# Patient Record
Sex: FEMALE | Race: WHITE | NOT HISPANIC OR LATINO | URBAN - METROPOLITAN AREA
[De-identification: names, ages, dates, MRNs, and addresses within clinical notes are randomized per-mention and may not be internally consistent; named-entity substitution may affect disease eponyms.]

---

## 2018-06-15 ENCOUNTER — TELEPHONE (OUTPATIENT)
Dept: NEUROLOGY | Facility: CLINIC | Age: 53
End: 2018-06-15

## 2018-06-15 NOTE — TELEPHONE ENCOUNTER
----- Message from Ivette Albright sent at 6/14/2018  3:05 PM CDT -----  Contact: Meghana (Daughter) [phone]  Needs Advice    Reason for call:    Meghana is requesting to speak to someone regarding the patient's condition. The patient is paying out of pocket and would like to be 100% sure she scheduling with the correct department for the issues she's having.    Communication Preference: PHONE

## 2022-03-30 ENCOUNTER — ANESTHESIA (OUTPATIENT)
Dept: SURGERY | Facility: HOSPITAL | Age: 57
End: 2022-03-30
Payer: MEDICAID

## 2022-03-30 ENCOUNTER — HOSPITAL ENCOUNTER (INPATIENT)
Facility: HOSPITAL | Age: 57
LOS: 5 days | Discharge: HOME | End: 2022-04-04
Attending: EMERGENCY MEDICINE | Admitting: SURGERY
Payer: MEDICAID

## 2022-03-30 ENCOUNTER — ANESTHESIA EVENT (OUTPATIENT)
Dept: SURGERY | Facility: HOSPITAL | Age: 57
End: 2022-03-30
Payer: MEDICAID

## 2022-03-30 DIAGNOSIS — K66.8 PNEUMOPERITONEUM: ICD-10-CM

## 2022-03-30 DIAGNOSIS — K27.5 PERFORATED ULCER: ICD-10-CM

## 2022-03-30 DIAGNOSIS — Z91.89 AT RISK FOR LONG QT SYNDROME: ICD-10-CM

## 2022-03-30 DIAGNOSIS — K28.3 ACUTE MARGINAL ULCER: Primary | ICD-10-CM

## 2022-03-30 LAB
CTP QC/QA: YES
POCT GLUCOSE: 123 MG/DL (ref 70–110)
SARS-COV-2 RDRP RESP QL NAA+PROBE: NEGATIVE

## 2022-03-30 PROCEDURE — 99285 EMERGENCY DEPT VISIT HI MDM: CPT | Mod: CS,,, | Performed by: EMERGENCY MEDICINE

## 2022-03-30 PROCEDURE — 27201423 OPTIME MED/SURG SUP & DEVICES STERILE SUPPLY: Performed by: SURGERY

## 2022-03-30 PROCEDURE — 63600175 PHARM REV CODE 636 W HCPCS: Performed by: NURSE ANESTHETIST, CERTIFIED REGISTERED

## 2022-03-30 PROCEDURE — D9220A PRA ANESTHESIA: Mod: CRNA,,, | Performed by: NURSE ANESTHETIST, CERTIFIED REGISTERED

## 2022-03-30 PROCEDURE — 25000003 PHARM REV CODE 250: Performed by: NURSE ANESTHETIST, CERTIFIED REGISTERED

## 2022-03-30 PROCEDURE — 71000033 HC RECOVERY, INTIAL HOUR: Performed by: SURGERY

## 2022-03-30 PROCEDURE — 43659 UNLISTED LAPS PX STOMACH: CPT | Mod: ,,, | Performed by: SURGERY

## 2022-03-30 PROCEDURE — 71000016 HC POSTOP RECOV ADDL HR: Performed by: SURGERY

## 2022-03-30 PROCEDURE — D9220A PRA ANESTHESIA: Mod: ANES,,, | Performed by: ANESTHESIOLOGY

## 2022-03-30 PROCEDURE — 63600175 PHARM REV CODE 636 W HCPCS: Performed by: STUDENT IN AN ORGANIZED HEALTH CARE EDUCATION/TRAINING PROGRAM

## 2022-03-30 PROCEDURE — 99223 PR INITIAL HOSPITAL CARE,LEVL III: ICD-10-PCS | Mod: 25,,, | Performed by: SURGERY

## 2022-03-30 PROCEDURE — 82962 GLUCOSE BLOOD TEST: CPT

## 2022-03-30 PROCEDURE — D9220A PRA ANESTHESIA: ICD-10-PCS | Mod: ANES,,, | Performed by: ANESTHESIOLOGY

## 2022-03-30 PROCEDURE — 94761 N-INVAS EAR/PLS OXIMETRY MLT: CPT

## 2022-03-30 PROCEDURE — 63600175 PHARM REV CODE 636 W HCPCS: Performed by: EMERGENCY MEDICINE

## 2022-03-30 PROCEDURE — C1729 CATH, DRAINAGE: HCPCS | Performed by: SURGERY

## 2022-03-30 PROCEDURE — U0002 COVID-19 LAB TEST NON-CDC: HCPCS | Performed by: STUDENT IN AN ORGANIZED HEALTH CARE EDUCATION/TRAINING PROGRAM

## 2022-03-30 PROCEDURE — 37000009 HC ANESTHESIA EA ADD 15 MINS: Performed by: SURGERY

## 2022-03-30 PROCEDURE — 20600001 HC STEP DOWN PRIVATE ROOM

## 2022-03-30 PROCEDURE — D9220A PRA ANESTHESIA: ICD-10-PCS | Mod: CRNA,,, | Performed by: NURSE ANESTHETIST, CERTIFIED REGISTERED

## 2022-03-30 PROCEDURE — 99285 PR EMERGENCY DEPT VISIT,LEVEL V: ICD-10-PCS | Mod: CS,,, | Performed by: EMERGENCY MEDICINE

## 2022-03-30 PROCEDURE — 71000015 HC POSTOP RECOV 1ST HR: Performed by: SURGERY

## 2022-03-30 PROCEDURE — 99223 1ST HOSP IP/OBS HIGH 75: CPT | Mod: 25,,, | Performed by: SURGERY

## 2022-03-30 PROCEDURE — 94640 AIRWAY INHALATION TREATMENT: CPT

## 2022-03-30 PROCEDURE — 96374 THER/PROPH/DIAG INJ IV PUSH: CPT

## 2022-03-30 PROCEDURE — 36000708 HC OR TIME LEV III 1ST 15 MIN: Performed by: SURGERY

## 2022-03-30 PROCEDURE — 37000008 HC ANESTHESIA 1ST 15 MINUTES: Performed by: SURGERY

## 2022-03-30 PROCEDURE — 43659 LAPARSCOPY; GASTRORRHAPHY, SUTURE OF PERFORATED DUODENAL OR GASTRIC ULCER, WOUND OR INJURY: ICD-10-PCS | Mod: ,,, | Performed by: SURGERY

## 2022-03-30 PROCEDURE — 25000242 PHARM REV CODE 250 ALT 637 W/ HCPCS: Performed by: ANESTHESIOLOGY

## 2022-03-30 PROCEDURE — 36000709 HC OR TIME LEV III EA ADD 15 MIN: Performed by: SURGERY

## 2022-03-30 PROCEDURE — 99285 EMERGENCY DEPT VISIT HI MDM: CPT | Mod: 25

## 2022-03-30 PROCEDURE — 25000003 PHARM REV CODE 250: Performed by: SURGERY

## 2022-03-30 PROCEDURE — 63600175 PHARM REV CODE 636 W HCPCS: Performed by: ANESTHESIOLOGY

## 2022-03-30 PROCEDURE — 25000003 PHARM REV CODE 250: Performed by: STUDENT IN AN ORGANIZED HEALTH CARE EDUCATION/TRAINING PROGRAM

## 2022-03-30 RX ORDER — MELOXICAM 15 MG/1
15 TABLET ORAL DAILY
COMMUNITY
End: 2022-03-30

## 2022-03-30 RX ORDER — LORAZEPAM 0.5 MG/1
0.5 TABLET ORAL EVERY 6 HOURS PRN
COMMUNITY

## 2022-03-30 RX ORDER — IPRATROPIUM BROMIDE AND ALBUTEROL SULFATE 2.5; .5 MG/3ML; MG/3ML
3 SOLUTION RESPIRATORY (INHALATION) EVERY 4 HOURS
Status: DISCONTINUED | OUTPATIENT
Start: 2022-03-30 | End: 2022-03-30

## 2022-03-30 RX ORDER — IPRATROPIUM BROMIDE AND ALBUTEROL SULFATE 2.5; .5 MG/3ML; MG/3ML
3 SOLUTION RESPIRATORY (INHALATION) EVERY 4 HOURS
Status: DISCONTINUED | OUTPATIENT
Start: 2022-03-30 | End: 2022-03-30 | Stop reason: HOSPADM

## 2022-03-30 RX ORDER — ROCURONIUM BROMIDE 10 MG/ML
INJECTION, SOLUTION INTRAVENOUS
Status: DISCONTINUED | OUTPATIENT
Start: 2022-03-30 | End: 2022-03-30

## 2022-03-30 RX ORDER — AMITRIPTYLINE HYDROCHLORIDE 75 MG/1
75 TABLET ORAL NIGHTLY
COMMUNITY

## 2022-03-30 RX ORDER — HYDROMORPHONE HYDROCHLORIDE 1 MG/ML
0.5 INJECTION, SOLUTION INTRAMUSCULAR; INTRAVENOUS; SUBCUTANEOUS EVERY 4 HOURS PRN
Status: DISCONTINUED | OUTPATIENT
Start: 2022-03-30 | End: 2022-04-03

## 2022-03-30 RX ORDER — MIDAZOLAM HYDROCHLORIDE 1 MG/ML
INJECTION INTRAMUSCULAR; INTRAVENOUS
Status: DISCONTINUED | OUTPATIENT
Start: 2022-03-30 | End: 2022-03-30

## 2022-03-30 RX ORDER — PIPERACILLIN SODIUM, TAZOBACTAM SODIUM 4; .5 G/20ML; G/20ML
INJECTION, POWDER, LYOPHILIZED, FOR SOLUTION INTRAVENOUS
Status: DISCONTINUED | OUTPATIENT
Start: 2022-03-30 | End: 2022-03-30

## 2022-03-30 RX ORDER — ONDANSETRON 2 MG/ML
INJECTION INTRAMUSCULAR; INTRAVENOUS
Status: DISCONTINUED | OUTPATIENT
Start: 2022-03-30 | End: 2022-03-30

## 2022-03-30 RX ORDER — VASOPRESSIN 20 [USP'U]/ML
INJECTION, SOLUTION INTRAMUSCULAR; SUBCUTANEOUS
Status: DISCONTINUED | OUTPATIENT
Start: 2022-03-30 | End: 2022-03-30

## 2022-03-30 RX ORDER — PROPOFOL 10 MG/ML
VIAL (ML) INTRAVENOUS
Status: DISCONTINUED | OUTPATIENT
Start: 2022-03-30 | End: 2022-03-30

## 2022-03-30 RX ORDER — PHENYLEPHRINE HCL IN 0.9% NACL 1 MG/10 ML
SYRINGE (ML) INTRAVENOUS
Status: DISCONTINUED | OUTPATIENT
Start: 2022-03-30 | End: 2022-03-30

## 2022-03-30 RX ORDER — HYDROMORPHONE HYDROCHLORIDE 1 MG/ML
0.2 INJECTION, SOLUTION INTRAMUSCULAR; INTRAVENOUS; SUBCUTANEOUS EVERY 5 MIN PRN
Status: DISCONTINUED | OUTPATIENT
Start: 2022-03-30 | End: 2022-03-30 | Stop reason: HOSPADM

## 2022-03-30 RX ORDER — ONDANSETRON 2 MG/ML
4 INJECTION INTRAMUSCULAR; INTRAVENOUS EVERY 6 HOURS PRN
Status: DISCONTINUED | OUTPATIENT
Start: 2022-03-30 | End: 2022-04-04 | Stop reason: HOSPADM

## 2022-03-30 RX ORDER — SODIUM CHLORIDE 0.9 % (FLUSH) 0.9 %
3 SYRINGE (ML) INJECTION
Status: DISCONTINUED | OUTPATIENT
Start: 2022-03-30 | End: 2022-04-04 | Stop reason: HOSPADM

## 2022-03-30 RX ORDER — BUPROPION HYDROCHLORIDE 150 MG/1
150 TABLET ORAL DAILY
COMMUNITY

## 2022-03-30 RX ORDER — KETAMINE HCL IN 0.9 % NACL 50 MG/5 ML
SYRINGE (ML) INTRAVENOUS
Status: DISCONTINUED | OUTPATIENT
Start: 2022-03-30 | End: 2022-03-30

## 2022-03-30 RX ORDER — LIDOCAINE HYDROCHLORIDE AND EPINEPHRINE 10; 10 MG/ML; UG/ML
INJECTION, SOLUTION INFILTRATION; PERINEURAL
Status: DISCONTINUED | OUTPATIENT
Start: 2022-03-30 | End: 2022-03-30 | Stop reason: HOSPADM

## 2022-03-30 RX ORDER — DEXAMETHASONE SODIUM PHOSPHATE 4 MG/ML
INJECTION, SOLUTION INTRA-ARTICULAR; INTRALESIONAL; INTRAMUSCULAR; INTRAVENOUS; SOFT TISSUE
Status: DISCONTINUED | OUTPATIENT
Start: 2022-03-30 | End: 2022-03-30

## 2022-03-30 RX ORDER — FENTANYL CITRATE 50 UG/ML
INJECTION, SOLUTION INTRAMUSCULAR; INTRAVENOUS
Status: DISCONTINUED | OUTPATIENT
Start: 2022-03-30 | End: 2022-03-30

## 2022-03-30 RX ORDER — PHENYLEPHRINE HYDROCHLORIDE 10 MG/ML
INJECTION INTRAVENOUS
Status: DISCONTINUED | OUTPATIENT
Start: 2022-03-30 | End: 2022-03-30

## 2022-03-30 RX ORDER — IPRATROPIUM BROMIDE AND ALBUTEROL SULFATE 2.5; .5 MG/3ML; MG/3ML
3 SOLUTION RESPIRATORY (INHALATION) ONCE
Status: COMPLETED | OUTPATIENT
Start: 2022-03-30 | End: 2022-03-30

## 2022-03-30 RX ORDER — BUPIVACAINE HYDROCHLORIDE 2.5 MG/ML
INJECTION, SOLUTION EPIDURAL; INFILTRATION; INTRACAUDAL
Status: DISCONTINUED | OUTPATIENT
Start: 2022-03-30 | End: 2022-03-30 | Stop reason: HOSPADM

## 2022-03-30 RX ORDER — HYDROMORPHONE HYDROCHLORIDE 1 MG/ML
1 INJECTION, SOLUTION INTRAMUSCULAR; INTRAVENOUS; SUBCUTANEOUS
Status: COMPLETED | OUTPATIENT
Start: 2022-03-30 | End: 2022-03-30

## 2022-03-30 RX ORDER — ENOXAPARIN SODIUM 100 MG/ML
40 INJECTION SUBCUTANEOUS EVERY 24 HOURS
Status: DISCONTINUED | OUTPATIENT
Start: 2022-03-30 | End: 2022-04-02

## 2022-03-30 RX ORDER — FAMOTIDINE 10 MG/ML
INJECTION INTRAVENOUS
Status: DISCONTINUED | OUTPATIENT
Start: 2022-03-30 | End: 2022-03-30

## 2022-03-30 RX ORDER — VALSARTAN 320 MG/1
320 TABLET ORAL DAILY
COMMUNITY

## 2022-03-30 RX ORDER — SODIUM CHLORIDE, SODIUM LACTATE, POTASSIUM CHLORIDE, CALCIUM CHLORIDE 600; 310; 30; 20 MG/100ML; MG/100ML; MG/100ML; MG/100ML
INJECTION, SOLUTION INTRAVENOUS CONTINUOUS
Status: DISCONTINUED | OUTPATIENT
Start: 2022-03-30 | End: 2022-04-03

## 2022-03-30 RX ORDER — ARIPIPRAZOLE 10 MG/1
5 TABLET ORAL DAILY
COMMUNITY

## 2022-03-30 RX ORDER — SUCCINYLCHOLINE CHLORIDE 20 MG/ML
INJECTION INTRAMUSCULAR; INTRAVENOUS
Status: DISCONTINUED | OUTPATIENT
Start: 2022-03-30 | End: 2022-03-30

## 2022-03-30 RX ORDER — LIDOCAINE HYDROCHLORIDE 10 MG/ML
1 INJECTION, SOLUTION EPIDURAL; INFILTRATION; INTRACAUDAL; PERINEURAL ONCE
Status: COMPLETED | OUTPATIENT
Start: 2022-03-30 | End: 2022-03-30

## 2022-03-30 RX ORDER — LIDOCAINE HCL/PF 100 MG/5ML
SYRINGE (ML) INTRAVENOUS
Status: DISCONTINUED | OUTPATIENT
Start: 2022-03-30 | End: 2022-03-30

## 2022-03-30 RX ADMIN — PROPOFOL 150 MG: 10 INJECTION, EMULSION INTRAVENOUS at 10:03

## 2022-03-30 RX ADMIN — ROCURONIUM BROMIDE 5 MG: 10 INJECTION, SOLUTION INTRAVENOUS at 09:03

## 2022-03-30 RX ADMIN — VASOPRESSIN 2 UNITS: 20 INJECTION, SOLUTION INTRAMUSCULAR; SUBCUTANEOUS at 10:03

## 2022-03-30 RX ADMIN — DEXAMETHASONE SODIUM PHOSPHATE 4 MG: 4 INJECTION INTRA-ARTICULAR; INTRALESIONAL; INTRAMUSCULAR; INTRAVENOUS; SOFT TISSUE at 10:03

## 2022-03-30 RX ADMIN — ENOXAPARIN SODIUM 40 MG: 40 INJECTION SUBCUTANEOUS at 05:03

## 2022-03-30 RX ADMIN — HYDROMORPHONE HYDROCHLORIDE 0.2 MG: 1 INJECTION, SOLUTION INTRAMUSCULAR; INTRAVENOUS; SUBCUTANEOUS at 12:03

## 2022-03-30 RX ADMIN — FENTANYL CITRATE 100 MCG: 50 INJECTION, SOLUTION INTRAMUSCULAR; INTRAVENOUS at 09:03

## 2022-03-30 RX ADMIN — HYDROMORPHONE HYDROCHLORIDE 1 MG: 1 INJECTION, SOLUTION INTRAMUSCULAR; INTRAVENOUS; SUBCUTANEOUS at 07:03

## 2022-03-30 RX ADMIN — ONDANSETRON 4 MG: 2 INJECTION INTRAMUSCULAR; INTRAVENOUS at 10:03

## 2022-03-30 RX ADMIN — Medication 20 MG: at 09:03

## 2022-03-30 RX ADMIN — MIDAZOLAM HYDROCHLORIDE 2 MG: 1 INJECTION, SOLUTION INTRAMUSCULAR; INTRAVENOUS at 09:03

## 2022-03-30 RX ADMIN — IPRATROPIUM BROMIDE AND ALBUTEROL SULFATE 3 ML: 2.5; .5 SOLUTION RESPIRATORY (INHALATION) at 01:03

## 2022-03-30 RX ADMIN — PHENYLEPHRINE HYDROCHLORIDE 200 MCG: 10 INJECTION INTRAVENOUS at 09:03

## 2022-03-30 RX ADMIN — SODIUM CHLORIDE, SODIUM LACTATE, POTASSIUM CHLORIDE, AND CALCIUM CHLORIDE: .6; .31; .03; .02 INJECTION, SOLUTION INTRAVENOUS at 04:03

## 2022-03-30 RX ADMIN — SODIUM CHLORIDE, SODIUM LACTATE, POTASSIUM CHLORIDE, AND CALCIUM CHLORIDE: .6; .31; .03; .02 INJECTION, SOLUTION INTRAVENOUS at 08:03

## 2022-03-30 RX ADMIN — HYDROMORPHONE HYDROCHLORIDE 0.5 MG: 1 INJECTION, SOLUTION INTRAMUSCULAR; INTRAVENOUS; SUBCUTANEOUS at 08:03

## 2022-03-30 RX ADMIN — SODIUM CHLORIDE, SODIUM LACTATE, POTASSIUM CHLORIDE, AND CALCIUM CHLORIDE: .6; .31; .03; .02 INJECTION, SOLUTION INTRAVENOUS at 10:03

## 2022-03-30 RX ADMIN — VASOPRESSIN 3 UNITS: 20 INJECTION, SOLUTION INTRAMUSCULAR; SUBCUTANEOUS at 10:03

## 2022-03-30 RX ADMIN — HYDROMORPHONE HYDROCHLORIDE 0.5 MG: 1 INJECTION, SOLUTION INTRAMUSCULAR; INTRAVENOUS; SUBCUTANEOUS at 02:03

## 2022-03-30 RX ADMIN — LIDOCAINE HYDROCHLORIDE 80 MG: 20 INJECTION, SOLUTION INTRAVENOUS at 09:03

## 2022-03-30 RX ADMIN — SUCCINYLCHOLINE CHLORIDE 180 MG: 20 INJECTION, SOLUTION INTRAMUSCULAR; INTRAVENOUS at 09:03

## 2022-03-30 RX ADMIN — PIPERACILLIN AND TAZOBACTAM 4.5 G: 4; .5 INJECTION, POWDER, LYOPHILIZED, FOR SOLUTION INTRAVENOUS; PARENTERAL at 10:03

## 2022-03-30 RX ADMIN — Medication 200 MCG: at 11:03

## 2022-03-30 RX ADMIN — FAMOTIDINE 20 MG: 10 INJECTION, SOLUTION INTRAVENOUS at 10:03

## 2022-03-30 RX ADMIN — LIDOCAINE HYDROCHLORIDE 10 MG: 10 INJECTION, SOLUTION EPIDURAL; INFILTRATION; INTRACAUDAL at 08:03

## 2022-03-30 RX ADMIN — SODIUM CHLORIDE, SODIUM GLUCONATE, SODIUM ACETATE, POTASSIUM CHLORIDE, MAGNESIUM CHLORIDE, SODIUM PHOSPHATE, DIBASIC, AND POTASSIUM PHOSPHATE: .53; .5; .37; .037; .03; .012; .00082 INJECTION, SOLUTION INTRAVENOUS at 10:03

## 2022-03-30 RX ADMIN — ROCURONIUM BROMIDE 45 MG: 10 INJECTION, SOLUTION INTRAVENOUS at 10:03

## 2022-03-30 RX ADMIN — SUGAMMADEX 200 MG: 100 INJECTION, SOLUTION INTRAVENOUS at 11:03

## 2022-03-30 NOTE — TRANSFER OF CARE
"Anesthesia Transfer of Care Note    Patient: Geraldine Willson    Procedure(s) Performed: Procedure(s) (LRB):  LAPAROSCOPY, DIAGNOSTIC - egd, laparascopic repair of ulcer with patch (N/A)    Patient location: PACU    Transport from OR: Transported from OR on 6-10 L/min O2 by face mask with adequate spontaneous ventilation    Post pain: adequate analgesia    Post assessment: no apparent anesthetic complications and tolerated procedure well    Post vital signs: stable    Level of consciousness: responds to stimulation    Nausea/Vomiting: no vomiting    Complications: none    Transfer of care protocol was followed      Last vitals:   Visit Vitals  BP (!) 90/46 (BP Location: Left arm, Patient Position: Lying)   Pulse 98   Temp 37.1 °C (98.7 °F) (Oral)   Resp 14   Ht 5' 4" (1.626 m)   Wt 123.4 kg (272 lb)   SpO2 100%   BMI 46.69 kg/m²     "

## 2022-03-30 NOTE — H&P
[facility] - Emergency Dept  General Surgery  History & Physical    Patient Name: Geraldine Willson  MRN: [id number]  Admission Date: 3/30/2022  Attending Physician: Vidal Davies MD   Primary Care Provider: No primary care provider on file.    Patient information was obtained from patient, past medical records and ER records.     Subjective:     Chief Complaint/Reason for Admission: Abdominal Pain    History of Present Illness: Geraldine Willson is a 57 yo female with a history of RNYGB in 2011 with [place] and recent knee surgery who presents as a transfer for pneumoperitoneum. She reports acute onset of pain starting at 9pm last night. At first thought she was having a heart attack because pain was located in the upper abdomen and chest. Pain continued and migrated to her lower abdomen as well. Never had this type of pain before. Associated nausea, no emesis. Has loose bowel movement yesterday- no blood or melena. No fevers or chills, sob, constipation. Last PO intake yesterday at 2pm. She also reports that she has been taking approx 800-1000mg Ibuprofen daily since her knee surgery about 2 months ago. She was previously taking eliquis after knee surgery but stopped it 1 month ago.   AT OSH, WBC 15. CT scan with evidence of pneumoperitoneum at OSH with concern for GJ perforation.     Abdominal surgeries: lap RNYGB, c-sections   Anticoagulation: none       No current facility-administered medications on file prior to encounter.     Current Outpatient Medications on File Prior to Encounter   Medication Sig    amitriptyline (ELAVIL) 75 MG tablet Take 75 mg by mouth every evening.    ARIPiprazole (ABILIFY) 10 MG Tab Take 5 mg by mouth once daily.    buPROPion (WELLBUTRIN XL) 150 MG TB24 tablet Take 150 mg by mouth once daily.    LORazepam (ATIVAN) 0.5 MG tablet Take 0.5 mg by mouth every 6 (six) hours as needed for Anxiety.    meloxicam (MOBIC) 15 MG tablet Take 15 mg by mouth once daily.    valsartan (DIOVAN) 320  "MG tablet Take 320 mg by mouth once daily.       Review of patient's allergies indicates:   Allergen Reactions    Latuda [lurasidone] Other (See Comments)     "Makes my brain bad"    Lyrica [pregabalin] Other (See Comments)     "Makes my brain bad"       No past medical history on file.  No past surgical history on file.  Family History    None       Tobacco Use    Smoking status: Not on file    Smokeless tobacco: Not on file   Substance and Sexual Activity    Alcohol use: Not on file    Drug use: Not on file    Sexual activity: Not on file     Review of Systems   Constitutional:  Positive for appetite change. Negative for activity change, diaphoresis, fatigue and fever.   Respiratory:  Negative for cough and shortness of breath.    Cardiovascular:  Positive for chest pain. Negative for palpitations.   Gastrointestinal:  Positive for abdominal distention, abdominal pain, diarrhea and nausea. Negative for constipation and vomiting.   Genitourinary:  Positive for difficulty urinating. Negative for dyspareunia.   Skin:  Negative for color change and wound.   Neurological:  Negative for dizziness and weakness.   Hematological:  Negative for adenopathy. Does not bruise/bleed easily.   Objective:     Vital Signs (Most Recent):  Temp: 98.4 °F (36.9 °C) (03/30/22 0638)  Pulse: 90 (03/30/22 0633)  Resp: 18 (03/30/22 0712)  BP: 107/76 (03/30/22 0633)  SpO2: 98 % (03/30/22 0633) Vital Signs (24h Range):  Temp:  [98.4 °F (36.9 °C)] 98.4 °F (36.9 °C)  Pulse:  [90] 90  Resp:  [18-22] 18  SpO2:  [98 %] 98 %  BP: (107)/(76) 107/76        There is no height or weight on file to calculate BMI.    Physical Exam  Vitals and nursing note reviewed.   Constitutional:       General: She is not in acute distress.     Appearance: Normal appearance. She is not ill-appearing, toxic-appearing or diaphoretic.   Cardiovascular:      Rate and Rhythm: Normal rate and regular rhythm.      Pulses: Normal pulses.   Pulmonary:      Effort: " Pulmonary effort is normal. No respiratory distress.   Abdominal:      General: There is no distension.      Palpations: Abdomen is soft.      Tenderness: There is abdominal tenderness (diffuse, worse in epigastric region).      Hernia: No hernia is present.      Comments: Previous lap incisions well healed   Musculoskeletal:         General: Normal range of motion.   Skin:     General: Skin is warm.      Capillary Refill: Capillary refill takes less than 2 seconds.      Coloration: Skin is not jaundiced.   Neurological:      General: No focal deficit present.      Mental Status: She is alert and oriented to person, place, and time.       Significant Labs:  I have reviewed all pertinent lab results within the past 24 hours.  CBC: No results for input(s): WBC, RBC, HGB, HCT, PLT, MCV, MCH, MCHC in the last 168 hours.  BMP: No results for input(s): GLU, NA, K, CL, CO2, BUN, CREATININE, CALCIUM, MG in the last 168 hours.  CMP: No results for input(s): GLU, CALCIUM, ALBUMIN, PROT, NA, K, CO2, CL, BUN, CREATININE, ALKPHOS, ALT, AST, BILITOT in the last 168 hours.  LFTs: No results for input(s): ALT, AST, ALKPHOS, BILITOT, PROT, ALBUMIN in the last 168 hours.  Coagulation: No results for input(s): LABPROT, INR, APTT in the last 168 hours.    Significant Diagnostics:  I have reviewed all pertinent imaging results/findings within the past 24 hours.  CT a/p at outside hospital: Pneumoperitoneum, notable at anterior abdominal wall, as well as surrounding previous G-J anastomosis     Assessment/Plan:     * Pneumoperitoneum  57 yo female with a history of RNYGB in 2011 with [place] and recent increased NSAID use due to knee surgery found to have pneumoperitoneum on CT scan from OSH concern for perforation at GJ.    -Class A for diagnostic lap poss open, EGD  -Will obtain consent. Risks and benefits discussed  -NPO   -mIVF   -Rodriguez in place   -Hold home meds   -PRN pain and nausea control      VTE Risk Mitigation (From  admission, onward)         Ordered     enoxaparin injection 40 mg  Daily         03/30/22 0801     Place MARY BETH hose  Until discontinued         03/30/22 0801     Place sequential compression device  Until discontinued         03/30/22 0801     IP VTE LOW RISK PATIENT  Once         03/30/22 0801                Brendan Mendoza MD  General Surgery  [facility] - Emergency Dept    I have personally taken the history and examined this patient and agree with the resident's note as stated above.         Dirk Segundo MD

## 2022-03-30 NOTE — ED NOTES
Notified Dr. Davies of patient's current blood pressure status (81/50) and discussed possible fluid administration.

## 2022-03-30 NOTE — NURSING TRANSFER
Nursing Transfer Note      3/30/2022     Reason patient is being transferred: post procedure    Transfer To: 1055      Transfer via bed    Transfer with n/a    Transported by n/a    Medicines sent: n/a    Any special needs or follow-up needed: n/a    Chart send with patient: Yes    Notified: brother    Patient reassessed at: 3/30/22 @4090

## 2022-03-30 NOTE — ED TRIAGE NOTES
Pt. Came to ER for abd pain since last night. Pain in located in LLQ, upper epigastric area, and midlower pubic area. Pt. Rates at 9/10.  Pt. States she has had diarrhea but denies vomiting. Pt. Denies chest pain and SOB. . Pt. Took pepto bismol with no relief.

## 2022-03-30 NOTE — ED PROVIDER NOTES
"Source of History:  pt    Chief complaint:  Transfer (Pt from [facility] for perforated bowel. )      HPI:  Geraldine Willson is a 56 y.o. female presenting transferred from outside facility for bowel perforation.  Patient has had abdominal pain, CT there showed free air near the esophageal gastric area.  Patient notes severe pain which improved with Dilaudid.  She is also nauseated, require Zofran during transport.  She was given Zosyn, IV fluids.    ROS: As per HPI and below:    General: No fever.  No chills.  Eyes: No visual changes.  Head: No headache.    Integument: No rashes or lesions.  Chest: No shortness of breath.  Cardiovascular: No chest pain.  Abdomen: abdominal pain.  nausea and vomiting.  Urinary: No abnormal urination.  Neurologic: No focal weakness.  No numbness.  Hematologic: No easy bruising.  Endocrine: No excessive thirst or urination.      Review of patient's allergies indicates:   Allergen Reactions    Latuda [lurasidone] Other (See Comments)     "Makes my brain bad"    Lyrica [pregabalin] Other (See Comments)     "Makes my brain bad"       No current facility-administered medications on file prior to encounter.     Current Outpatient Medications on File Prior to Encounter   Medication Sig Dispense Refill    amitriptyline (ELAVIL) 75 MG tablet Take 75 mg by mouth every evening.      ARIPiprazole (ABILIFY) 10 MG Tab Take 5 mg by mouth once daily.      buPROPion (WELLBUTRIN XL) 150 MG TB24 tablet Take 150 mg by mouth once daily.      LORazepam (ATIVAN) 0.5 MG tablet Take 0.5 mg by mouth every 6 (six) hours as needed for Anxiety.      valsartan (DIOVAN) 320 MG tablet Take 320 mg by mouth once daily.      [DISCONTINUED] meloxicam (MOBIC) 15 MG tablet Take 15 mg by mouth once daily.         PMH:  As per HPI and below:  Past Medical History:   Diagnosis Date     delivery delivered     FH: total knee replacement     Right knee     Past Surgical History:   Procedure Laterality " Date    KNEE SURGERY Right        Social History     Socioeconomic History    Marital status: Single   Tobacco Use    Smoking status: Former Smoker     Packs/day: 0.50     Years: 34.00     Pack years: 17.00     Types: Cigarettes     Quit date: 10/2021     Years since quittin.4    Smokeless tobacco: Never Used   Substance and Sexual Activity    Drug use: Yes     Frequency: 3.0 times per week     Types: Marijuana    Sexual activity: Not Currently     Birth control/protection: Post-menopausal       History reviewed. No pertinent family history.    Physical Exam:    Vitals:    22 0744   BP: (!) 90/46   Pulse: 98   Resp: 14   Temp: 98.7 °F (37.1 °C)     Appearance: No acute distress.  Skin: No rashes seen.  Good turgor.  No abrasions.  No ecchymoses.  Eyes: No conjunctival injection. EOMI, PERRL.  ENT: Oropharynx clear.    Chest:  No increased work of breathing, bilateral chest rise.  Cardiovascular: Regular rate and rhythm.  Normal equal bilateral radial pulses.  Abdomen: Soft.  Not distended.  Not rigid, but very tender diffusely, worse in left upper quadrant.  Musculoskeletal: Good range of motion all joints.  No deformities.  Neck supple, full range of motion, no obvious deformity.  Neurologic: Moves all extremities.  Normal sensation.  No facial droop.  Normal speech.    Mental Status:  Alert and oriented x 3.  Appropriate, conversant.          Laboratory Studies:  Labs Reviewed   POCT GLUCOSE - Abnormal; Notable for the following components:       Result Value    POCT Glucose 123 (*)     All other components within normal limits   SARS-COV-2 RDRP GENE   POCT GLUCOSE MONITORING CONTINUOUS       I decided to obtain the old medical records.  Reviewed and summarized the old medical record and it showed at outside facility found to have creatinine to for, was 0.9 six weeks ago.  White count to 15 today.  Given Zosyn, IV fluids at outside facility.    Imaging Results    None         Medications  Given:  Medications   lactated ringers bolus 1,000 mL (has no administration in time range)   lactated ringers infusion ( Intravenous New Bag 3/30/22 0814)   enoxaparin injection 40 mg (has no administration in time range)   HYDROmorphone injection 0.5 mg (has no administration in time range)   ondansetron injection 4 mg (has no administration in time range)   sodium chloride 0.9% flush 3 mL (has no administration in time range)   HYDROmorphone injection 1 mg (1 mg Intravenous Given 3/30/22 0712)   LIDOcaine (PF) 10 mg/ml (1%) injection 10 mg (10 mg Other Given by Other 3/30/22 0814)       Discussed with: general surgery    MDM:    56 y.o. female with bowel perforation noted at outside facility here for general surgery consultation.  She is slightly hypotensive, but notes her baseline systolic is around 90. She continued to have severe pain on arrival and was given 1 mg hydromorphone, on re-evaluation her pressure slightly worse.  She was started on maintenance fluids with stabilization of her blood pressure around 90 systolic.  General surgery was down to see patient upon arrival, will admit for possible operative management.      Diagnostic Impression:    1. Pneumoperitoneum             Vidal Davies MD  03/30/22 0049

## 2022-03-30 NOTE — ANESTHESIA PREPROCEDURE EVALUATION
"                                                                                                             03/30/2022  [facility]  Anesthesia Pre-Operative Evaluation         Patient Name: Geraldine Willson  YOB: 1965  MRN: [id number]    SUBJECTIVE:     Pre-operative evaluation for Procedure(s) (LRB):  LAPAROSCOPY, DIAGNOSTIC - NEED EGD (N/A)     03/30/2022    Geraldine Willson is a 56 y.o. female w/ a significant PMHx of HTN and anxiety who presents as a transfer from an OSH for bowel perforation.     Patient now presents for the above procedure(s).      LDA:        Peripheral IV - Single Lumen 03/30/22 0712 20 G Anterior;Proximal;Right Forearm (Active)   Number of days: 0       Prev airway: None documented.    Drips:    lactated ringers         Patient Active Problem List   Diagnosis    Pneumoperitoneum       Review of patient's allergies indicates:   Allergen Reactions    Latuda [lurasidone] Other (See Comments)     "Makes my brain bad"    Lyrica [pregabalin] Other (See Comments)     "Makes my brain bad"       Current Inpatient Medications:   enoxaparin  40 mg Subcutaneous Daily    lactated ringers  1,000 mL Intravenous Once    LIDOcaine (PF) 10 mg/ml (1%)  1 mL Other Once       No current facility-administered medications on file prior to encounter.     Current Outpatient Medications on File Prior to Encounter   Medication Sig Dispense Refill    amitriptyline (ELAVIL) 75 MG tablet Take 75 mg by mouth every evening.      ARIPiprazole (ABILIFY) 10 MG Tab Take 5 mg by mouth once daily.      buPROPion (WELLBUTRIN XL) 150 MG TB24 tablet Take 150 mg by mouth once daily.      LORazepam (ATIVAN) 0.5 MG tablet Take 0.5 mg by mouth every 6 (six) hours as needed for Anxiety.      valsartan (DIOVAN) 320 MG tablet Take 320 mg by mouth once daily.         Past Surgical History:   Procedure Laterality Date    KNEE SURGERY Right        Social History     Socioeconomic History    " Marital status: Single   Tobacco Use    Smoking status: Former Smoker     Packs/day: 0.50     Years: 34.00     Pack years: 17.00     Types: Cigarettes     Quit date: 10/2021     Years since quittin.4    Smokeless tobacco: Never Used   Substance and Sexual Activity    Drug use: Yes     Frequency: 3.0 times per week     Types: Marijuana    Sexual activity: Not Currently     Birth control/protection: Post-menopausal       OBJECTIVE:     Vital Signs Range (Last 24H):  Temp:  [36.9 °C (98.4 °F)-37.1 °C (98.7 °F)]   Pulse:  [90-98]   Resp:  [14-22]   BP: ()/(46-76)   SpO2:  [98 %-100 %]       Significant Labs:  Lab Results   Component Value Date    WBC 7.79 2011    HGB 13.4 2011    HCT 41.1 2011     2011    CHOL 142 2011    TRIG 139 2011    HDL 35 (L) 2011    ALT 25 2011    AST 25 2011     2011    K 4.1 2011     2011    CREATININE 0.6 2011    BUN 14 2011    CO2 29 2011       Diagnostic Studies: No relevant studies.    EKG: No results found for this or any previous visit.    ECHOCARDIOGRAM:  TTE:  No results found for this or any previous visit.      ASSESSMENT/PLAN:           Pre-op Assessment    I have reviewed the Patient Summary Reports.     I have reviewed the Nursing Notes. I have reviewed the NPO Status.   I have reviewed the Medications.     Review of Systems  Anesthesia Hx:  No problems with previous Anesthesia  History of prior surgery of interest to airway management or planning: Denies Family Hx of Anesthesia complications.    Social:  Non-Smoker    Hematology/Oncology:  Hematology Normal   Oncology Normal     EENT/Dental:EENT/Dental Normal   Cardiovascular:  Cardiovascular Normal     Pulmonary:  Pulmonary Normal    Renal/:  Renal/ Normal     Hepatic/GI:  Hepatic/GI Normal    Musculoskeletal:  Musculoskeletal Normal    Neurological:  Neurology Normal    Endocrine:  Endocrine Normal     Dermatological:  Skin Normal    Psych:  Psychiatric Normal           Physical Exam  General: Alert and Oriented    Airway:  Mallampati: II / II  Mouth Opening: Normal  TM Distance: Normal  Tongue: Normal  Neck ROM: Normal ROM    Dental:  Intact    Chest/Lungs:  Clear to auscultation, Normal Respiratory Rate    Heart:  Rate: Normal  Rhythm: Regular Rhythm  Sounds: Normal        Anesthesia Plan  Type of Anesthesia, risks & benefits discussed:    Anesthesia Type: Gen ETT  Intra-op Monitoring Plan: Standard ASA Monitors  Post Op Pain Control Plan: multimodal analgesia and IV/PO Opioids PRN  Induction:  rapid sequence  Airway Plan: Direct, Post-Induction  Informed Consent: Informed consent signed with the Patient and all parties understand the risks and agree with anesthesia plan.  All questions answered.   ASA Score: 3 Emergent  Day of Surgery Review of History & Physical: H&P Update referred to the surgeon/provider.    Ready For Surgery From Anesthesia Perspective.     .

## 2022-03-30 NOTE — OP NOTE
DATE OF PROCEDURE: 03/30/2022   SERVICE: Bariatric Surgery.   Surgeon(s) and Role:     * Dirk Segundo Jr., MD - Primary  PREOPERATIVE DIAGNOSES: Perforated Marginal Ulcer  POSTOPERATIVE DIAGNOSES:Same  PROCEDURE: Laparoscopic washout with repair of perforated marginal ulcer and placement of omental patch.  ANESTHESIA: General endotracheal and local.   DESCRIPTION OF PROCEDURE:  Patient was taken to the operating room placed under general anesthesia and prepped and draped in sterile fashion.  At this time incision was made approximately 15 cm from xiphoid and 5 cm to the left of midline after infiltrating with local anesthetic.  Using Optiview trocar intra-abdominal access was obtained under direct visualization without difficulty.  There was some mild inflammatory process noted but no significant fluid or other abnormality.  Further ports were then placed including bilateral anterior axillary subcostal 5 mm ports a midclavicular subcostal port on the right and a 2nd 12 mm port approximately 15 cm from the xiphoid just to the right of midline.  Once the ports were placed the patient was placed in steep reverse Trendelenburg and liver retractor was placed.  We noticed a fair amount of inflammatory process and adhesions from the anterior aspect of the pouch and Daniella limb to the liver.  These were taken down with sharp dissection.  Any bleeding was stopped with electrocautery.  At this time we also noted inflammatory process around the gastrojejunostomy.  There were no anterior abnormalities.  We dissected underneath the Daniella limb towards the gastrojejunostomy and noted a small hole present on the Daniella limb side of the posterior aspect of the anastomosis.  The we irrigated this copiously and suctioned this out.  The defect was then closed with 2-0 Polysorb suture and a suture of ice in figure-of-eight fashion.  We then did a leak test using EGD.  The scope was placed down the oropharynx and into the esophagus and  into the pouch.  The anastomosis was placed under fluid and and a bowel clamp was placed on the Daniella limb.  Upon blowing air from the EGD there was noted to be a small amount of bubbles noted.  A 2nd figure-of-eight suture was placed and a repeat leak test showed no significant abnormalities.  At this time a piece of omentum was brought posterior to the anastomosis and tacked circumferentially around the closed defect.  Once this was done the area again was irrigated copiously and suctioned.  No other abnormalities were noted.  At this time a liver retractor was removed and all ports removed under direct visualization.  Skin all 5 port sites were closed with 4-0 Monocryl suture in subcuticular fashion.  Mastisol and Steri-Strips were placed.  Patient was left with general anesthesia and transferred to bed for transport to recovery.  COMPLICATIONS: None.   SPONGE COUNT: Correct.   BLOOD LOSS:15 mL.   FLUIDS: Per Anesthesia.   BLOOD GIVEN: None.   DRAINS: None.   SPECIMENS: None  CONDITION OF THE PATIENT: Good.   I was present for the entire procedure.

## 2022-03-30 NOTE — HPI
Geraldine Willson is a 57 yo female with a history of RNYGB in 2011 with [place] and recent knee surgery who presents as a transfer for pneumoperitoneum. She reports acute onset of pain starting at 9pm last night. At first thought she was having a heart attack because pain was located in the upper abdomen and chest. Pain continued and migrated to her lower abdomen as well. Never had this type of pain before. Associated nausea, no emesis. Has loose bowel movement yesterday- no blood or melena. No fevers or chills, sob, constipation. Last PO intake yesterday at 2pm. She also reports that she has been taking approx 800-1000mg Ibuprofen daily since her knee surgery about 2 months ago. She was previously taking eliquis after knee surgery but stopped it 1 month ago.   AT OSH, WBC 15. CT scan with evidence of pneumoperitoneum at OSH with concern for GJ perforation.     Abdominal surgeries: lap RNYGB, c-sections   Anticoagulation: none

## 2022-03-30 NOTE — ANESTHESIA POSTPROCEDURE EVALUATION
Anesthesia Post Evaluation    Patient: Geraldine Willson    Procedure(s) Performed: Procedure(s) (LRB):  LAPAROSCOPY, DIAGNOSTIC - egd, laparascopic repair of ulcer with patch (N/A)    Final Anesthesia Type: general      Patient location during evaluation: PACU  Patient participation: Yes- Able to Participate  Level of consciousness: awake and alert  Post-procedure vital signs: reviewed and stable  Pain control: Pain has been treated.  Airway patency: patent    PONV status: PONV absent or treated.  Anesthetic complications: no      Cardiovascular status: hemodynamically stable  Respiratory status: spontaneous ventilation  Hydration status: euvolemic  Follow-up not needed.          Vitals Value Taken Time   /58 03/30/22 1445   Temp 37.3 °C (99.1 °F) 03/30/22 1445   Pulse 103 03/30/22 1445   Resp 18 03/30/22 1445   SpO2 95 % 03/30/22 1445         Event Time   Out of Recovery 12:15:00         Pain/Korey Score: Pain Rating Prior to Med Admin: 7 (3/30/2022  2:48 PM)  Pain Rating Post Med Admin: 2 (3/30/2022  1:00 PM)  Korey Score: 10 (3/30/2022 12:15 PM)

## 2022-03-30 NOTE — ASSESSMENT & PLAN NOTE
55 yo female with a history of RNYGB in 2011 with [place] and recent increased NSAID use due to knee surgery found to have pneumoperitoneum on CT scan from OSH concern for perforation at [facility].    -Class A for diagnostic lap poss open, EGD  -Will obtain consent. Risks and benefits discussed  -NPO   -mIVF   -Rodriguez in place   -Hold home meds   -PRN pain and nausea control

## 2022-03-30 NOTE — SUBJECTIVE & OBJECTIVE
"No current facility-administered medications on file prior to encounter.     Current Outpatient Medications on File Prior to Encounter   Medication Sig    amitriptyline (ELAVIL) 75 MG tablet Take 75 mg by mouth every evening.    ARIPiprazole (ABILIFY) 10 MG Tab Take 5 mg by mouth once daily.    buPROPion (WELLBUTRIN XL) 150 MG TB24 tablet Take 150 mg by mouth once daily.    LORazepam (ATIVAN) 0.5 MG tablet Take 0.5 mg by mouth every 6 (six) hours as needed for Anxiety.    meloxicam (MOBIC) 15 MG tablet Take 15 mg by mouth once daily.    valsartan (DIOVAN) 320 MG tablet Take 320 mg by mouth once daily.       Review of patient's allergies indicates:   Allergen Reactions    Latuda [lurasidone] Other (See Comments)     "Makes my brain bad"    Lyrica [pregabalin] Other (See Comments)     "Makes my brain bad"       No past medical history on file.  No past surgical history on file.  Family History    None       Tobacco Use    Smoking status: Not on file    Smokeless tobacco: Not on file   Substance and Sexual Activity    Alcohol use: Not on file    Drug use: Not on file    Sexual activity: Not on file     Review of Systems   Constitutional:  Positive for appetite change. Negative for activity change, diaphoresis, fatigue and fever.   Respiratory:  Negative for cough and shortness of breath.    Cardiovascular:  Positive for chest pain. Negative for palpitations.   Gastrointestinal:  Positive for abdominal distention, abdominal pain, diarrhea and nausea. Negative for constipation and vomiting.   Genitourinary:  Positive for difficulty urinating. Negative for dyspareunia.   Skin:  Negative for color change and wound.   Neurological:  Negative for dizziness and weakness.   Hematological:  Negative for adenopathy. Does not bruise/bleed easily.   Objective:     Vital Signs (Most Recent):  Temp: 98.4 °F (36.9 °C) (03/30/22 0638)  Pulse: 90 (03/30/22 0633)  Resp: 18 (03/30/22 0712)  BP: 107/76 (03/30/22 0633)  SpO2: 98 % " (03/30/22 0633) Vital Signs (24h Range):  Temp:  [98.4 °F (36.9 °C)] 98.4 °F (36.9 °C)  Pulse:  [90] 90  Resp:  [18-22] 18  SpO2:  [98 %] 98 %  BP: (107)/(76) 107/76        There is no height or weight on file to calculate BMI.    Physical Exam  Vitals and nursing note reviewed.   Constitutional:       General: She is not in acute distress.     Appearance: Normal appearance. She is not ill-appearing, toxic-appearing or diaphoretic.   Cardiovascular:      Rate and Rhythm: Normal rate and regular rhythm.      Pulses: Normal pulses.   Pulmonary:      Effort: Pulmonary effort is normal. No respiratory distress.   Abdominal:      General: There is no distension.      Palpations: Abdomen is soft.      Tenderness: There is abdominal tenderness (diffuse, worse in epigastric region).      Hernia: No hernia is present.      Comments: Previous lap incisions well healed   Musculoskeletal:         General: Normal range of motion.   Skin:     General: Skin is warm.      Capillary Refill: Capillary refill takes less than 2 seconds.      Coloration: Skin is not jaundiced.   Neurological:      General: No focal deficit present.      Mental Status: She is alert and oriented to person, place, and time.       Significant Labs:  I have reviewed all pertinent lab results within the past 24 hours.  CBC: No results for input(s): WBC, RBC, HGB, HCT, PLT, MCV, MCH, MCHC in the last 168 hours.  BMP: No results for input(s): GLU, NA, K, CL, CO2, BUN, CREATININE, CALCIUM, MG in the last 168 hours.  CMP: No results for input(s): GLU, CALCIUM, ALBUMIN, PROT, NA, K, CO2, CL, BUN, CREATININE, ALKPHOS, ALT, AST, BILITOT in the last 168 hours.  LFTs: No results for input(s): ALT, AST, ALKPHOS, BILITOT, PROT, ALBUMIN in the last 168 hours.  Coagulation: No results for input(s): LABPROT, INR, APTT in the last 168 hours.    Significant Diagnostics:  I have reviewed all pertinent imaging results/findings within the past 24 hours.  CT a/p at outside  hospital: Pneumoperitoneum, notable at anterior abdominal wall, as well as surrounding previous G-J anastomosis

## 2022-03-30 NOTE — ED NOTES
Patient identifiers verified and correct for   LOC: The patient is awake, alert and aware of environment with an appropriate affect, the patient is oriented x 3 and speaking appropriately.   APPEARANCE: Patient appears comfortable and in no acute distress, patient is clean and well groomed.  SKIN: The skin is warm and dry, color consistent with ethnicity, patient has normal skin turgor and moist mucus membranes, skin intact, no breakdown or bruising noted.   MUSCULOSKELETAL: Patient moving all extremities spontaneously, no swelling noted.  RESPIRATORY: Airway is open and patent, respirations are spontaneous, patient has a normal effort and rate, no accessory muscle use noted, pt placed on continuous pulse ox with O2 sats noted at 97% on room air.  CARDIAC: Pt placed on cardiac monitor. Patient has a normal rate and regular rhythm, no edema noted, capillary refill < 3 seconds.   GASTRO: Pt. Complains of pain rated at 9/10 at Mid uppergastric, mid lower pubic, and LLQ. Abd. Is soft, round and tender to touch.  : Pt denies any pain or frequency with urination. Pt. Has a arauz catheter in place since 1030 last night per pt.  NEURO: Pt opens eyes spontaneously, behavior appropriate to situation, follows commands, facial expression symmetrical, bilateral hand grasp equal and even, purposeful motor response noted, normal sensation in all extremities when touched with a finger.

## 2022-03-30 NOTE — BRIEF OP NOTE
[facility] - Surgery ([facility])  Brief Operative Note    SUMMARY     Surgery Date: 3/30/2022     Surgeon(s) and Role:     * Dirk Segundo Jr., MD - Primary    Assisting Surgeon: Brendan Mendoza MD - Res    Pre-op Diagnosis:  Pneumoperitoneum [K66.8]    Post-op Diagnosis:  Post-Op Diagnosis Codes:     * Pneumoperitoneum [K66.8]    Procedure(s) (LRB):  LAPAROSCOPY, DIAGNOSTIC - egd, laparascopic repair of ulcer with patch (N/A)    Anesthesia: General    Operative Findings: Marginal ulcer with perforation at gastrojejunal anastamosis.  Repaired primarily and john patch.  EGD without any other significant findings. Jace drain placed around repair site. See op note for details.    Estimated Blood Loss: Minimal    Estimated Blood Loss has been documented.         Specimens:   Specimen (24h ago, onward)            None          [id number]

## 2022-03-30 NOTE — ANESTHESIA PROCEDURE NOTES
Intubation    Date/Time: 3/30/2022 9:38 AM  Performed by: Alfredo Mchugh CRNA  Authorized by: Willie Aquino MD     Intubation:     Induction:  Rapid sequence induction    Intubated:  Postinduction    Mask Ventilation:  N/a    Attempts:  1    Attempted By:  LANDY    Blade:  Marcum 3    Laryngeal View Grade: Grade I - full view of cords      Difficult Airway Encountered?: No      Complications:  None    Airway Device:  Oral endotracheal tube    Airway Device Size:  7.5    Style/Cuff Inflation:  Cuffed    Inflation Amount (mL):  7    Tube secured:  21    Secured at:  The lips    Placement Verified By:  Capnometry    Complicating Factors:  None    Findings Post-Intubation:  BS equal bilateral

## 2022-03-31 LAB
ANION GAP SERPL CALC-SCNC: 12 MMOL/L (ref 8–16)
BASOPHILS # BLD AUTO: 0.05 K/UL (ref 0–0.2)
BASOPHILS NFR BLD: 0.3 % (ref 0–1.9)
BUN SERPL-MCNC: 41 MG/DL (ref 6–20)
CALCIUM SERPL-MCNC: 8.9 MG/DL (ref 8.7–10.5)
CHLORIDE SERPL-SCNC: 104 MMOL/L (ref 95–110)
CO2 SERPL-SCNC: 20 MMOL/L (ref 23–29)
CREAT SERPL-MCNC: 2.5 MG/DL (ref 0.5–1.4)
DIFFERENTIAL METHOD: ABNORMAL
EOSINOPHIL # BLD AUTO: 0 K/UL (ref 0–0.5)
EOSINOPHIL NFR BLD: 0 % (ref 0–8)
ERYTHROCYTE [DISTWIDTH] IN BLOOD BY AUTOMATED COUNT: 14.6 % (ref 11.5–14.5)
EST. GFR  (AFRICAN AMERICAN): 23.9 ML/MIN/1.73 M^2
EST. GFR  (NON AFRICAN AMERICAN): 20.7 ML/MIN/1.73 M^2
GLUCOSE SERPL-MCNC: 97 MG/DL (ref 70–110)
HCT VFR BLD AUTO: 35.4 % (ref 37–48.5)
HGB BLD-MCNC: 11.1 G/DL (ref 12–16)
IMM GRANULOCYTES # BLD AUTO: 0.08 K/UL (ref 0–0.04)
IMM GRANULOCYTES NFR BLD AUTO: 0.5 % (ref 0–0.5)
LYMPHOCYTES # BLD AUTO: 0.7 K/UL (ref 1–4.8)
LYMPHOCYTES NFR BLD: 4.9 % (ref 18–48)
MAGNESIUM SERPL-MCNC: 1.8 MG/DL (ref 1.6–2.6)
MCH RBC QN AUTO: 28.1 PG (ref 27–31)
MCHC RBC AUTO-ENTMCNC: 31.4 G/DL (ref 32–36)
MCV RBC AUTO: 90 FL (ref 82–98)
MONOCYTES # BLD AUTO: 0.7 K/UL (ref 0.3–1)
MONOCYTES NFR BLD: 4.6 % (ref 4–15)
NEUTROPHILS # BLD AUTO: 13.5 K/UL (ref 1.8–7.7)
NEUTROPHILS NFR BLD: 89.7 % (ref 38–73)
NRBC BLD-RTO: 0 /100 WBC
PHOSPHATE SERPL-MCNC: 4.3 MG/DL (ref 2.7–4.5)
PLATELET # BLD AUTO: 170 K/UL (ref 150–450)
PMV BLD AUTO: 11.6 FL (ref 9.2–12.9)
POCT GLUCOSE: 74 MG/DL (ref 70–110)
POCT GLUCOSE: 82 MG/DL (ref 70–110)
POTASSIUM SERPL-SCNC: 4.8 MMOL/L (ref 3.5–5.1)
RBC # BLD AUTO: 3.95 M/UL (ref 4–5.4)
SODIUM SERPL-SCNC: 136 MMOL/L (ref 136–145)
WBC # BLD AUTO: 15.05 K/UL (ref 3.9–12.7)

## 2022-03-31 PROCEDURE — 93010 EKG 12-LEAD: ICD-10-PCS | Mod: ,,, | Performed by: INTERNAL MEDICINE

## 2022-03-31 PROCEDURE — 97535 SELF CARE MNGMENT TRAINING: CPT

## 2022-03-31 PROCEDURE — 93005 ELECTROCARDIOGRAM TRACING: CPT

## 2022-03-31 PROCEDURE — 63600175 PHARM REV CODE 636 W HCPCS: Performed by: STUDENT IN AN ORGANIZED HEALTH CARE EDUCATION/TRAINING PROGRAM

## 2022-03-31 PROCEDURE — 80048 BASIC METABOLIC PNL TOTAL CA: CPT | Performed by: STUDENT IN AN ORGANIZED HEALTH CARE EDUCATION/TRAINING PROGRAM

## 2022-03-31 PROCEDURE — 97165 OT EVAL LOW COMPLEX 30 MIN: CPT

## 2022-03-31 PROCEDURE — 36415 COLL VENOUS BLD VENIPUNCTURE: CPT | Performed by: STUDENT IN AN ORGANIZED HEALTH CARE EDUCATION/TRAINING PROGRAM

## 2022-03-31 PROCEDURE — 97161 PT EVAL LOW COMPLEX 20 MIN: CPT

## 2022-03-31 PROCEDURE — 97116 GAIT TRAINING THERAPY: CPT

## 2022-03-31 PROCEDURE — 20600001 HC STEP DOWN PRIVATE ROOM

## 2022-03-31 PROCEDURE — 93010 ELECTROCARDIOGRAM REPORT: CPT | Mod: ,,, | Performed by: INTERNAL MEDICINE

## 2022-03-31 PROCEDURE — 84100 ASSAY OF PHOSPHORUS: CPT | Performed by: STUDENT IN AN ORGANIZED HEALTH CARE EDUCATION/TRAINING PROGRAM

## 2022-03-31 PROCEDURE — 85025 COMPLETE CBC W/AUTO DIFF WBC: CPT | Performed by: STUDENT IN AN ORGANIZED HEALTH CARE EDUCATION/TRAINING PROGRAM

## 2022-03-31 PROCEDURE — 83735 ASSAY OF MAGNESIUM: CPT | Performed by: STUDENT IN AN ORGANIZED HEALTH CARE EDUCATION/TRAINING PROGRAM

## 2022-03-31 RX ORDER — FLUCONAZOLE 2 MG/ML
400 INJECTION, SOLUTION INTRAVENOUS ONCE
Status: COMPLETED | OUTPATIENT
Start: 2022-03-31 | End: 2022-03-31

## 2022-03-31 RX ORDER — FLUCONAZOLE 2 MG/ML
200 INJECTION, SOLUTION INTRAVENOUS
Status: DISCONTINUED | OUTPATIENT
Start: 2022-04-01 | End: 2022-04-04 | Stop reason: HOSPADM

## 2022-03-31 RX ORDER — ENOXAPARIN SODIUM 100 MG/ML
40 INJECTION SUBCUTANEOUS EVERY 12 HOURS
Status: CANCELLED | OUTPATIENT
Start: 2022-03-31

## 2022-03-31 RX ADMIN — HYDROMORPHONE HYDROCHLORIDE 0.5 MG: 1 INJECTION, SOLUTION INTRAMUSCULAR; INTRAVENOUS; SUBCUTANEOUS at 02:03

## 2022-03-31 RX ADMIN — HYDROMORPHONE HYDROCHLORIDE 0.5 MG: 1 INJECTION, SOLUTION INTRAMUSCULAR; INTRAVENOUS; SUBCUTANEOUS at 04:03

## 2022-03-31 RX ADMIN — HYDROMORPHONE HYDROCHLORIDE 0.5 MG: 1 INJECTION, SOLUTION INTRAMUSCULAR; INTRAVENOUS; SUBCUTANEOUS at 08:03

## 2022-03-31 RX ADMIN — PIPERACILLIN SODIUM AND TAZOBACTAM SODIUM 4.5 G: 4; .5 INJECTION, POWDER, FOR SOLUTION INTRAVENOUS at 11:03

## 2022-03-31 RX ADMIN — SODIUM CHLORIDE, SODIUM LACTATE, POTASSIUM CHLORIDE, AND CALCIUM CHLORIDE: .6; .31; .03; .02 INJECTION, SOLUTION INTRAVENOUS at 05:03

## 2022-03-31 RX ADMIN — HYDROMORPHONE HYDROCHLORIDE 0.5 MG: 1 INJECTION, SOLUTION INTRAMUSCULAR; INTRAVENOUS; SUBCUTANEOUS at 06:03

## 2022-03-31 RX ADMIN — ENOXAPARIN SODIUM 40 MG: 40 INJECTION SUBCUTANEOUS at 06:03

## 2022-03-31 RX ADMIN — SODIUM CHLORIDE, SODIUM LACTATE, POTASSIUM CHLORIDE, AND CALCIUM CHLORIDE 1000 ML: .6; .31; .03; .02 INJECTION, SOLUTION INTRAVENOUS at 07:03

## 2022-03-31 RX ADMIN — FLUCONAZOLE IN SODIUM CHLORIDE 400 MG: 2 INJECTION, SOLUTION INTRAVENOUS at 11:03

## 2022-03-31 RX ADMIN — PIPERACILLIN SODIUM AND TAZOBACTAM SODIUM 4.5 G: 4; .5 INJECTION, POWDER, FOR SOLUTION INTRAVENOUS at 06:03

## 2022-03-31 RX ADMIN — HYDROMORPHONE HYDROCHLORIDE 0.5 MG: 1 INJECTION, SOLUTION INTRAMUSCULAR; INTRAVENOUS; SUBCUTANEOUS at 12:03

## 2022-03-31 RX ADMIN — HYDROMORPHONE HYDROCHLORIDE 0.5 MG: 1 INJECTION, SOLUTION INTRAMUSCULAR; INTRAVENOUS; SUBCUTANEOUS at 10:03

## 2022-03-31 NOTE — PLAN OF CARE
Problem: Occupational Therapy  Goal: Occupational Therapy Goal  Description: Goals to be met by: 4/7/2022 (1 week)     Patient will increase functional independence with ADLs by performing:    UE Dressing with [place].  LE Dressing with [place].  Grooming while standing at sink with [place].  Toileting from toilet with [place] for hygiene and clothing management.   Rolling to Bilateral with [place].   Supine to sit with [place].  Step transfer with [place]  Toilet transfer to toilet with [place].    Evaluated pt and established OT POC. Continue OT as tolerated.  Ana Petty OT  3/31/2022    Outcome: Ongoing, Progressing

## 2022-03-31 NOTE — PLAN OF CARE
Pt is alert/oriented x4 , with frequent forgetfulness. Pt's abdominal pain is managed with Dilaudid prn. KESHAV Drain had an output of 50 cc during night shift. Pt was placed on 2L NC due to SPO2 saturation 82-87%,current SPO2 saturation on 2 L NC is 95%. Report given to day shift RN. JAYDEN           Problem: Adult Inpatient Plan of Care  Goal: Plan of Care Review  Outcome: Ongoing, Progressing  Goal: Patient-Specific Goal (Individualized)  Outcome: Ongoing, Progressing  Goal: Absence of Hospital-Acquired Illness or Injury  Outcome: Ongoing, Progressing  Goal: Optimal Comfort and Wellbeing  Outcome: Ongoing, Progressing  Goal: Readiness for Transition of Care  Outcome: Ongoing, Progressing     Problem: Bariatric Environmental Safety  Goal: Safety Maintained with Care  Outcome: Ongoing, Progressing

## 2022-03-31 NOTE — PLAN OF CARE
[facility]  Initial Discharge Assessment       Primary Care Provider: No primary care provider on file. - Patient stated her PCP is NP [place] Dave. 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     Admission Diagnosis: Pneumoperitoneum [K66.8]    Admission Date: 3/30/2022  Expected Discharge Date: 4/1/2022    Discharge Barriers Identified: None    Payor: [place] [employer]ICA[employer] / Plan: [facility] / Product Type: Managed Medicaid /     Extended Emergency Contact Information  Primary Emergency Contact: vitaedyammy  Mobile Phone: [phone]  Relation: Daughter  Preferred language: English   needed? No  Secondary Emergency Contact: oneil gorman  Mobile Phone: [phone]  Relation: Daughter  Preferred language: English   needed? No  Mother: yoselin kimble  Mobile Phone: [phone]    Discharge Plan A: Home  Discharge Plan B: [facility]      [id number] - [place], [place] - [address]  [address]  [address] [facility] [address]  [facility] [address]  Phone: [phone] Fax: [phone]      Initial Assessment (most recent)     Adult Discharge Assessment - 03/31/22 1314        Discharge Assessment    Assessment Type Discharge Planning Assessment     Confirmed/corrected address, phone number and insurance Yes     Confirmed Demographics Correct on Facesheet     Source of Information patient     Communicated JENNIFER with patient/caregiver Yes     Lives With child(jose maria), adult     Do you expect to return to your current living situation? Yes     Do you have help at home or someone to help you manage your care at home? Yes     Prior to hospitilization cognitive status: Alert/Oriented     Current cognitive status: Alert/Oriented     Walking or Climbing Stairs Difficulty none     Dressing/Bathing Difficulty none     Equipment Currently Used at Home none     Readmission within 30 days? No     Do you currently have service(s) that help you manage your care at home? No     Do you take  prescription medications? Yes     Do you have prescription coverage? Yes     Do you have any problems affording any of your prescribed medications? No     Is the patient taking medications as prescribed? yes     Who is going to help you get home at discharge? daughter     Are you on dialysis? No     Do you take coumadin? No     Discharge Plan A Home     Discharge Plan B Home Health     DME Needed Upon Discharge  walker, rolling   Patient request    Discharge Plan discussed with: Patient     Discharge Barriers Identified None                  Anel Ulloa RN, CM   Ext: [phone]

## 2022-03-31 NOTE — SUBJECTIVE & OBJECTIVE
"Interval History: NAEON, pain well controlled, denies N/V, ambulating, AF, HDS    Medications:  Continuous Infusions:   lactated ringers 150 mL/hr at 03/31/22 0742     Scheduled Meds:   enoxaparin  40 mg Subcutaneous Daily    [START ON 4/1/2022] fluconazole (DIFLUCAN) IV (PEDS and ADULTS)  200 mg Intravenous Q24H    fluconazole (DIFLUCAN) IV (PEDS and ADULTS)  400 mg Intravenous Q24H    piperacillin-tazobactam (ZOSYN) IVPB  4.5 g Intravenous Q8H     PRN Meds:HYDROmorphone, ondansetron, sodium chloride 0.9%     Review of patient's allergies indicates:   Allergen Reactions    Latuda [lurasidone] Other (See Comments)     "Makes my brain bad"    Lyrica [pregabalin] Other (See Comments)     "Makes my brain bad"     Objective:     Vital Signs (Most Recent):  Temp: 98.5 °F (36.9 °C) (03/31/22 0735)  Pulse: 100 (03/31/22 0735)  Resp: 16 (03/31/22 0735)  BP: (!) 130/58 (03/31/22 0735)  SpO2: 100 % (03/31/22 0735)   Vital Signs (24h Range):  Temp:  [98.1 °F (36.7 °C)-99.1 °F (37.3 °C)] 98.5 °F (36.9 °C)  Pulse:  [] 100  Resp:  [12-35] 16  SpO2:  [92 %-100 %] 100 %  BP: ()/(52-84) 130/58     Weight: 123.4 kg (272 lb 0.8 oz)  Body mass index is 46.67 kg/m².    Intake/Output - Last 3 Shifts         03/29 0700 03/30 0659 03/30 0700 03/31 0659 03/31 0700 04/01 0659    IV Piggyback  800     Total Intake(mL/kg)  800 (6.5)     Drains  50     Total Output  50     Net  +750            Stool Occurrence  0 x             Physical Exam  Vitals and nursing note reviewed.   Constitutional:       General: She is not in acute distress.     Appearance: Normal appearance. She is not ill-appearing, toxic-appearing or diaphoretic.   Cardiovascular:      Rate and Rhythm: Normal rate and regular rhythm.      Pulses: Normal pulses.   Pulmonary:      Effort: Pulmonary effort is normal. No respiratory distress.   Abdominal:      General: There is no distension.      Palpations: Abdomen is soft.      Tenderness: There is abdominal " tenderness. There is no guarding.      Hernia: No hernia is present.      Comments: Abd soft, appropriately tender, non-distended, incisions CDI, steristrips in place   Musculoskeletal:         General: Normal range of motion.   Skin:     General: Skin is warm.      Capillary Refill: Capillary refill takes less than 2 seconds.      Coloration: Skin is not jaundiced.   Neurological:      General: No focal deficit present.      Mental Status: She is alert and oriented to person, place, and time.       Significant Labs:  CBC:   Recent Labs   Lab 03/31/22 0451   WBC 15.05*   RBC 3.95*   HGB 11.1*   HCT 35.4*      MCV 90   MCH 28.1   MCHC 31.4*     CMP:   Recent Labs   Lab 03/31/22 0451   GLU 97   CALCIUM 8.9      K 4.8   CO2 20*      BUN 41*   CREATININE 2.5*       Significant Diagnostics:  I have reviewed all pertinent imaging results/findings within the past 24 hours.

## 2022-03-31 NOTE — PT/OT/SLP EVAL
Occupational Therapy   Co-Evaluation and Co-Treat  Co-treatment with PT for maximal pt participation, safety, and activity tolerance       Name: Geraldine Willson  MRN: [id number]  Admitting Diagnosis:  Pneumoperitoneum  Recent Surgery: Procedure(s) (LRB):  LAPAROSCOPY, DIAGNOSTIC - egd, laparascopic repair of ulcer with patch (N/A) 1 Day Post-Op    Recommendations:     Discharge Recommendations: other (see comments) (resume OP)  Discharge Equipment Recommendations:  none  Barriers to discharge:  None    Assessment:     Geraldine Willson is a 56 y.o. female with a medical diagnosis of Pneumoperitoneum.  She presents with impaired ADL and mobility performance deficits. Pt found upright and agreeable for therapy. Pt states she lives with her daughter in a mobile home and was (I) with ADLs PTA. Pt was recently mobilizing using SPC and attending outpatient PT s/p R knee surgery. During eval today, pt voiced 10/10 abdominal pain however motivated to complete OOB tasks. Pt mobilized in room and into hallway with CGA-HHA however wide base of support noticed and slightly ataxic gait. Pt with 2 standing rest breaks 2/2 fatigue with pt on 2L 02. Pt was left in chair voicing appreciation. Pt would benefit from continued OT skilled services 3x/wk to improve daily living skills to optimize QOL. Pt is recommended to discharge to resume OP at this time.   Performance deficits affecting function: weakness, impaired self care skills, impaired endurance, impaired functional mobilty, gait instability, decreased lower extremity function, pain, impaired balance, decreased upper extremity function, impaired cardiopulmonary response to activity.      Rehab Prognosis: Good; patient would benefit from acute skilled OT services to address these deficits and reach maximum level of function.       Plan:     Patient to be seen 3 x/week to address the above listed problems via self-care/home management, therapeutic activities, therapeutic  exercises  · Plan of Care Expires: 04/30/22  · Plan of Care Reviewed with: patient    Subjective     Chief Complaint: 10/10 Abdomen pain   Patient/Family Comments/goals: return home    Occupational Profile:  Living Environment: pt lives with her daughter in a mobile home with 5 [address]. Pt has a tub for bathing.  Previous level of function: I with ADLs and mobility, states she was recently using a SPC s/p knee surgery 2 months ago (RLE).  Roles and Routines: Pt still drives; attends OP. Enjoys spending time with grandchildren  Equipment Used at Home:  bedside commode, rollator, cane, straight  Assistance upon Discharge: family    Pain/Comfort:  · Pain Rating 1: 10/10  · Location 1: abdomen  · Pain Addressed 1: Distraction, Reposition    Patients cultural, spiritual, Sikh conflicts given the current situation: no    Objective:     Communicated with: RN prior to session.  Patient found HOB elevated with oxygen, KESHAV drain upon OT entry to room.    General Precautions: Standard, fall   Orthopedic Precautions:N/A   Braces: N/A  Respiratory Status: Nasal cannula, flow 2 L/min    Occupational Performance:    Bed Mobility:    · Patient completed Rolling/Turning to Left with  minimum assistance  · Patient completed Scooting/Bridging with minimum assistance  · Patient completed Supine to Sit with minimum assistance    Functional Mobility/Transfers:  · Patient completed Sit <> Stand Transfer with contact guard assistance  with  hand-held assist   · Patient completed Bed <> Chair Transfer using Step Transfer technique with contact guard assistance with hand-held assist  · Functional Mobility: pt stood from bed and mobilized into hallway with CGA-HHA. Pt with wide ELLIOT and 2 standing breaks 2/2 fatigue.    Activities of Daily Living:  · Upper Body Dressing: minimum assistance donning gown at EOB     Cognitive/Visual Perceptual:  Cognitive/Psychosocial Skills:     -       Oriented to: Person, Place, Time and Situation   -        Follows Commands/attention:Follows multistep  commands  -       Communication: clear/fluent  -       Memory: No Deficits noted  -       Safety awareness/insight to disability: intact   -       Mood/Affect/Coping skills/emotional control: Pleasant    Physical Exam:  Balance:    -       demo good sitting and standing blaance   Upper Extremity Range of Motion:     -       Right Upper Extremity: WFL  -       Left Upper Extremity: WFL  Upper Extremity Strength:    -       Right Upper Extremity: WFL  -       Left Upper Extremity: WFL   Strength:    -       Right Upper Extremity: WFL  -       Left Upper Extremity: WFL    AMPAC 6 Click ADL:  AMPAC Total Score: 20    Treatment & Education:  Pt educated on role of occupational therapy, POC, and safety during ADLs and functional mobility. Pt and OT discussed importance of safe, continued mobility to optimize daily living skills. Pt verbalized understanding.   White board updated during session. Pt given instruction to call for medical staff/nurse for assistance.     Education:    Patient left up in chair with all lines intact, call button in reach and RN notified    GOALS:   Multidisciplinary Problems     Occupational Therapy Goals        Problem: Occupational Therapy    Goal Priority Disciplines Outcome Interventions   Occupational Therapy Goal     OT, PT/OT Ongoing, Progressing    Description: Goals to be met by: 2022 (1 week)     Patient will increase functional independence with ADLs by performing:    UE Dressing with [place].  LE Dressing with [place].  Grooming while standing at sink with [place].  Toileting from toilet with [place] for hygiene and clothing management.   Rolling to Bilateral with [place].   Supine to sit with [place].  Step transfer with [place]  Toilet transfer to toilet with [place].                     History:     Past Medical History:   Diagnosis Date     delivery delivered     FH: total knee  replacement     Right knee       Past Surgical History:   Procedure Laterality Date    DIAGNOSTIC LAPAROSCOPY N/A 3/30/2022    Procedure: LAPAROSCOPY, DIAGNOSTIC - egd, laparascopic repair of ulcer with patch;  Surgeon: Dirk Segundo Jr., MD;  Location: [facility] OR [address] [place] [address];  Service: General;  Laterality: N/A;    KNEE SURGERY Right        Time Tracking:     OT Date of Treatment: 03/31/22  OT Start Time: 1022  OT Stop Time: 1044  OT Total Time (min): 22 min    Billable Minutes:Evaluation 10 min  Self Care/Home Management 12 min    3/31/2022

## 2022-03-31 NOTE — ASSESSMENT & PLAN NOTE
55 yo female with a history of RNYGB in 2011 with [place] and recent increased NSAID use due to knee surgery found to have a perforated marginal ulcer s/p lap repair and omental patch repair of her ulcer    -Strict NPO   -mIVF   - IV abx and fluc  - PT/OT, encourage ambulation  -Hold home meds   -PRN pain and nausea control    Will plan for upper GI study tomorrow

## 2022-03-31 NOTE — PT/OT/SLP EVAL
Physical Therapy Co-Evaluation with OT and Treatment     Patient Name:  Geraldine Willson  MRN: [id number]    Admit Date: 3/30/2022  Admitting Diagnosis:  Pneumoperitoneum  Length of Stay: 1 days  Recent Surgery: Procedure(s) (LRB):  LAPAROSCOPY, DIAGNOSTIC - egd, laparascopic repair of ulcer with patch (N/A) 1 Day Post-Op    Recommendations:     Discharge Recommendations: Outpatient PT (resume)  Equipment recommendations: none  Barriers to discharge: None Identified     Assessment:     Geraldine Willson is a 56 y.o. female admitted to [facility] [place] on 3/30/2022 with medical diagnosis of Pneumoperitoneum. Pt presents with weakness, impaired endurance, impaired sensation, impaired functional mobilty, gait instability, impaired balance, pain, impaired cardiopulmonary response to activity. These deficits effect their roles and responsibilities in which they were able to complete prior to admit. PLOF was Samira, using rollator and SPC to ambulate. Randy for bed mobility to assist with rolling technique to help alleviate abdominal pain. Able to ambulate 80 ft CGA HHA x1 with 2 standing rest breaks 2/2 c/o dizziness. Geraldine Willson would benefit from acute PT intervention to improve quality of life, focus on recovery of impairments, provide patient/caregiver education, reduce fall risk, and maximize (I) and safety with functional mobility. Once medically stable, recommending pt discharge to Outpatient PT .      Rehab Prognosis: Good    Plan:     During this hospitalization, patient to be seen 4 x/week to address the identified rehab impairments via gait training, therapeutic activities, therapeutic exercises and progress towards stated goals.     Plan of Care Expires:     Plan of Care reviewed with: patient    This plan of care has been discussed with the patient/caregiver, who was included in its development and is in agreement with the identified goals and treatment plan.     Subjective     Communicated with RN prior to session.   "Patient found supine upon PT entry to room, agreeable to evaluation. Pt alone during session.    Chief Complaint: Transfer (Pt from [facility] for perforated bowel. )      Patient/Family Comments/goals: "I like to take my granddaughters places" in response to her hobbies    Pain/Comfort:  · Pain Rating 1: 10/10  · Location 1: abdomen  · Pain Addressed 1: Reposition, Distraction, Cessation of Activity  · Pain Rating Post-Intervention 1: 10/10    Patients cultural, spiritual, Congregational conflicts given the current situation: None identified     Patient History: information obtained from pt     Living Environment: Pt lives with daughter (23 yo) in mobile home  with 5 ISABEL (L handrail). Bathroom set-up: tub/shower combo  Prior Level of Function: modified (I) for mobility and ADLs using rollator and SPC as AD   DME owned: single point cane, bedside commode and rollator  Support Available/Caregiver Assistance: family    Objective:   OT present for cotreat due to pt's multiple medical comorbidities and functional/cognition deficits requiring two skilled therapists to appropriately progress pt's musculoskeletal strength, neuromuscular control, and endurance while taking into consideration medical acuity    Patient found with: oxygen, KESHAV drain    Recent Surgery: Procedure(s) (LRB):  LAPAROSCOPY, DIAGNOSTIC - egd, laparascopic repair of ulcer with patch (N/A) 1 Day Post-Op  General Precautions: Standard, fall   Orthopedic Precautions:N/A   Braces: N/A   Oxygen Device: nasal cannula      Exams:     Cognition:  · Oriented X 4  · Command following: Follows multistep verbal commands  · Communication: clear/fluent     Sensation:   o Light touch sensation: Intact BLEs     Edema/Skin Integrity: None noted; Visible skin intact     Lower Extremity Range of Motion:  o Right Lower Extremity: WFL  o Left Lower Extremity: WFL     Lower Extremity Strength:  o Right Lower Extremity: grossly 4/5  o Left Lower Extremity: grossly "     Functional Mobility:    Bed Mobility:  · Rolling to left with minimum assistance  · Supine > Sit with minimum assistance    Transfers:   · Sit <> Stand Transfer: Contact Guard Assistance   · 1 trial from EOB with no AD              Gait:  · Distance: 80 ft  · Assistance level: Contact Guard Assistance with HHA x1  · Assistive Device: none  · Gait Assessment: decreased step length , decreased gait speed and narrow base of support    Balance:  · Dynamic Sitting: FAIR+: Maintains balance through MINIMAL excursions of active trunk motion  · Standing:  · Static: FAIR+: Takes MINIMAL challenges from all directions   · Dynamic: FAIR: Needs CONTACT GUARD during gait    Outcome Measure: AM-PAC 6 CLICK MOBILITY  Total Score:18     Patient/Caregiver Education:     Therapist educated pt/caregiver regarding:    PT POC and goals for therapy    Safety with mobility and fall risk    Instruction on use of call button and importance of calling nursing staff for assistance with mobility    Time provided for therapeutic counseling and discussion of current health disposition. All questions answered to satisfaction, within scope of PT practice     Patient/caregiver able to verbalize understanding and expressed no further questions this visit; will follow-up with pt/caregiver during current admit for additional questions/concerns within scope of practice.     White board updated.     Patient left up in chair with call button in reach.    GOALS:   Multidisciplinary Problems     Physical Therapy Goals        Problem: Physical Therapy    Goal Priority Disciplines Outcome Goal Variances Interventions   Physical Therapy Goal     PT, PT/OT Ongoing, Progressing     Description: Goals to be met by: 4/10/22    Patient will increase functional independence with mobility by performin. Supine to sit with Stand-by Assistance  2. Sit to supine with Stand-by Assistance  3. Sit to stand transfer with Stand-by Assistance  4. Gait  x 100  feet with Stand-by Assistance using LRAD.  5. Ascend/descend 5 stairs with left Handrails Contact Guard Assistance using No Assistive Device.                        History:     Past Medical History:   Diagnosis Date     delivery delivered     FH: total knee replacement     Right knee       Past Surgical History:   Procedure Laterality Date    DIAGNOSTIC LAPAROSCOPY N/A 3/30/2022    Procedure: LAPAROSCOPY, DIAGNOSTIC - egd, laparascopic repair of ulcer with patch;  Surgeon: Dirk Segundo Jr., MD;  Location: [facility] OR [address] [place] [address];  Service: General;  Laterality: N/A;    KNEE SURGERY Right        Time Tracking:     PT Received On: 22  PT Start Time: 1024     PT Stop Time: 1047  PT Total Time (min): 23 min     Billable Minutes: Evaluation 1 unit and Gait Training 15    2022

## 2022-03-31 NOTE — PROGRESS NOTES
"Darell Ryan - [facility]  General Surgery  Progress Note    Subjective:     History of Present Illness:  Geraldine Willson is a 57 yo female with a history of RNYGB in 2011 with Sanya and recent knee surgery who presents as a transfer for pneumoperitoneum. She reports acute onset of pain starting at 9pm last night. At first thought she was having a heart attack because pain was located in the upper abdomen and chest. Pain continued and migrated to her lower abdomen as well. Never had this type of pain before. Associated nausea, no emesis. Has loose bowel movement yesterday- no blood or melena. No fevers or chills, sob, constipation. Last PO intake yesterday at 2pm. She also reports that she has been taking approx 800-1000mg Ibuprofen daily since her knee surgery about 2 months ago. She was previously taking eliquis after knee surgery but stopped it 1 month ago.   AT OSH, WBC 15. CT scan with evidence of pneumoperitoneum at OSH with concern for GJ perforation.     Abdominal surgeries: lap RNYGB, c-sections   Anticoagulation: none       Post-Op Info:  Procedure(s) (LRB):  LAPAROSCOPY, DIAGNOSTIC - egd, laparascopic repair of ulcer with patch (N/A)   1 Day Post-Op     Interval History: NAEON, pain well controlled, denies N/V, ambulating, AF, HDS    Medications:  Continuous Infusions:   lactated ringers 150 mL/hr at 03/31/22 0742     Scheduled Meds:   enoxaparin  40 mg Subcutaneous Daily    [START ON 4/1/2022] fluconazole (DIFLUCAN) IV (PEDS and ADULTS)  200 mg Intravenous Q24H    fluconazole (DIFLUCAN) IV (PEDS and ADULTS)  400 mg Intravenous Q24H    piperacillin-tazobactam (ZOSYN) IVPB  4.5 g Intravenous Q8H     PRN Meds:HYDROmorphone, ondansetron, sodium chloride 0.9%     Review of patient's allergies indicates:   Allergen Reactions    Latuda [lurasidone] Other (See Comments)     "Makes my brain bad"    Lyrica [pregabalin] Other (See Comments)     "Makes my brain bad"     Objective:     Vital Signs (Most " Recent):  Temp: 98.5 °F (36.9 °C) (03/31/22 0735)  Pulse: 100 (03/31/22 0735)  Resp: 16 (03/31/22 0735)  BP: (!) 130/58 (03/31/22 0735)  SpO2: 100 % (03/31/22 0735)   Vital Signs (24h Range):  Temp:  [98.1 °F (36.7 °C)-99.1 °F (37.3 °C)] 98.5 °F (36.9 °C)  Pulse:  [] 100  Resp:  [12-35] 16  SpO2:  [92 %-100 %] 100 %  BP: ()/(52-84) 130/58     Weight: 123.4 kg (272 lb 0.8 oz)  Body mass index is 46.67 kg/m².    Intake/Output - Last 3 Shifts         03/29 0700 03/30 0659 03/30 0700 03/31 0659 03/31 0700 04/01 0659    IV Piggyback  800     Total Intake(mL/kg)  800 (6.5)     Drains  50     Total Output  50     Net  +750            Stool Occurrence  0 x             Physical Exam  Vitals and nursing note reviewed.   Constitutional:       General: She is not in acute distress.     Appearance: Normal appearance. She is not ill-appearing, toxic-appearing or diaphoretic.   Cardiovascular:      Rate and Rhythm: Normal rate and regular rhythm.      Pulses: Normal pulses.   Pulmonary:      Effort: Pulmonary effort is normal. No respiratory distress.   Abdominal:      General: There is no distension.      Palpations: Abdomen is soft.      Tenderness: There is abdominal tenderness. There is no guarding.      Hernia: No hernia is present.      Comments: Abd soft, appropriately tender, non-distended, incisions CDI, steristrips in place   Musculoskeletal:         General: Normal range of motion.   Skin:     General: Skin is warm.      Capillary Refill: Capillary refill takes less than 2 seconds.      Coloration: Skin is not jaundiced.   Neurological:      General: No focal deficit present.      Mental Status: She is alert and oriented to person, place, and time.       Significant Labs:  CBC:   Recent Labs   Lab 03/31/22  0451   WBC 15.05*   RBC 3.95*   HGB 11.1*   HCT 35.4*      MCV 90   MCH 28.1   MCHC 31.4*     CMP:   Recent Labs   Lab 03/31/22  0451   GLU 97   CALCIUM 8.9      K 4.8   CO2 20*       BUN 41*   CREATININE 2.5*       Significant Diagnostics:  I have reviewed all pertinent imaging results/findings within the past 24 hours.    Assessment/Plan:     * Pneumoperitoneum  55 yo female with a history of RNYGB in 2011 with [place] and recent increased NSAID use due to knee surgery found to have a perforated marginal ulcer s/p lap repair and omental patch repair of her ulcer    -Strict NPO   -mIVF   - IV abx and fluc  - PT/OT, encourage ambulation  -Hold home meds   -PRN pain and nausea control    Will plan for upper GI study tomorrow        Emigdio Bower MD  General Surgery  [facility]

## 2022-03-31 NOTE — PLAN OF CARE
Eval complete. POC initiated.    Problem: Physical Therapy  Goal: Physical Therapy Goal  Description: Goals to be met by: 4/10/22    Patient will increase functional independence with mobility by performin. Supine to sit with Stand-by Assistance  2. Sit to supine with Stand-by Assistance  3. Sit to stand transfer with Stand-by Assistance  4. Gait  x 100 feet with Stand-by Assistance using LRAD.  5. Ascend/descend 5 stairs with left Handrails Contact Guard Assistance using No Assistive Device.     Outcome: Ongoing, Progressing     3/31/2022

## 2022-04-01 LAB
ANION GAP SERPL CALC-SCNC: 11 MMOL/L (ref 8–16)
BASOPHILS # BLD AUTO: 0.03 K/UL (ref 0–0.2)
BASOPHILS NFR BLD: 0.4 % (ref 0–1.9)
BUN SERPL-MCNC: 29 MG/DL (ref 6–20)
CALCIUM SERPL-MCNC: 9.1 MG/DL (ref 8.7–10.5)
CHLORIDE SERPL-SCNC: 104 MMOL/L (ref 95–110)
CO2 SERPL-SCNC: 22 MMOL/L (ref 23–29)
CREAT SERPL-MCNC: 1.1 MG/DL (ref 0.5–1.4)
DIFFERENTIAL METHOD: ABNORMAL
EOSINOPHIL # BLD AUTO: 0.1 K/UL (ref 0–0.5)
EOSINOPHIL NFR BLD: 1.2 % (ref 0–8)
ERYTHROCYTE [DISTWIDTH] IN BLOOD BY AUTOMATED COUNT: 14.7 % (ref 11.5–14.5)
EST. GFR  (AFRICAN AMERICAN): >60 ML/MIN/1.73 M^2
EST. GFR  (NON AFRICAN AMERICAN): 55.9 ML/MIN/1.73 M^2
GLUCOSE SERPL-MCNC: 77 MG/DL (ref 70–110)
HCT VFR BLD AUTO: 33.6 % (ref 37–48.5)
HGB BLD-MCNC: 10.4 G/DL (ref 12–16)
IMM GRANULOCYTES # BLD AUTO: 0.03 K/UL (ref 0–0.04)
IMM GRANULOCYTES NFR BLD AUTO: 0.4 % (ref 0–0.5)
LYMPHOCYTES # BLD AUTO: 0.5 K/UL (ref 1–4.8)
LYMPHOCYTES NFR BLD: 7 % (ref 18–48)
MAGNESIUM SERPL-MCNC: 1.8 MG/DL (ref 1.6–2.6)
MCH RBC QN AUTO: 28.6 PG (ref 27–31)
MCHC RBC AUTO-ENTMCNC: 31 G/DL (ref 32–36)
MCV RBC AUTO: 92 FL (ref 82–98)
MONOCYTES # BLD AUTO: 0.3 K/UL (ref 0.3–1)
MONOCYTES NFR BLD: 3.4 % (ref 4–15)
NEUTROPHILS # BLD AUTO: 6.8 K/UL (ref 1.8–7.7)
NEUTROPHILS NFR BLD: 87.6 % (ref 38–73)
NRBC BLD-RTO: 0 /100 WBC
PHOSPHATE SERPL-MCNC: 2.7 MG/DL (ref 2.7–4.5)
PLATELET # BLD AUTO: 181 K/UL (ref 150–450)
PMV BLD AUTO: 11.3 FL (ref 9.2–12.9)
POTASSIUM SERPL-SCNC: 4.3 MMOL/L (ref 3.5–5.1)
RBC # BLD AUTO: 3.64 M/UL (ref 4–5.4)
SODIUM SERPL-SCNC: 137 MMOL/L (ref 136–145)
WBC # BLD AUTO: 7.75 K/UL (ref 3.9–12.7)

## 2022-04-01 PROCEDURE — 80048 BASIC METABOLIC PNL TOTAL CA: CPT | Performed by: STUDENT IN AN ORGANIZED HEALTH CARE EDUCATION/TRAINING PROGRAM

## 2022-04-01 PROCEDURE — 25000003 PHARM REV CODE 250: Performed by: STUDENT IN AN ORGANIZED HEALTH CARE EDUCATION/TRAINING PROGRAM

## 2022-04-01 PROCEDURE — 63600175 PHARM REV CODE 636 W HCPCS: Performed by: STUDENT IN AN ORGANIZED HEALTH CARE EDUCATION/TRAINING PROGRAM

## 2022-04-01 PROCEDURE — 85025 COMPLETE CBC W/AUTO DIFF WBC: CPT | Performed by: STUDENT IN AN ORGANIZED HEALTH CARE EDUCATION/TRAINING PROGRAM

## 2022-04-01 PROCEDURE — 36415 COLL VENOUS BLD VENIPUNCTURE: CPT | Performed by: STUDENT IN AN ORGANIZED HEALTH CARE EDUCATION/TRAINING PROGRAM

## 2022-04-01 PROCEDURE — 25500020 PHARM REV CODE 255: Performed by: SURGERY

## 2022-04-01 PROCEDURE — 97116 GAIT TRAINING THERAPY: CPT

## 2022-04-01 PROCEDURE — 84100 ASSAY OF PHOSPHORUS: CPT | Performed by: STUDENT IN AN ORGANIZED HEALTH CARE EDUCATION/TRAINING PROGRAM

## 2022-04-01 PROCEDURE — 20600001 HC STEP DOWN PRIVATE ROOM

## 2022-04-01 PROCEDURE — 83735 ASSAY OF MAGNESIUM: CPT | Performed by: STUDENT IN AN ORGANIZED HEALTH CARE EDUCATION/TRAINING PROGRAM

## 2022-04-01 RX ADMIN — HYDROMORPHONE HYDROCHLORIDE 0.5 MG: 1 INJECTION, SOLUTION INTRAMUSCULAR; INTRAVENOUS; SUBCUTANEOUS at 12:04

## 2022-04-01 RX ADMIN — HYDROMORPHONE HYDROCHLORIDE 0.5 MG: 1 INJECTION, SOLUTION INTRAMUSCULAR; INTRAVENOUS; SUBCUTANEOUS at 09:04

## 2022-04-01 RX ADMIN — HYDROMORPHONE HYDROCHLORIDE 0.5 MG: 1 INJECTION, SOLUTION INTRAMUSCULAR; INTRAVENOUS; SUBCUTANEOUS at 07:04

## 2022-04-01 RX ADMIN — FLUCONAZOLE 200 MG: 2 INJECTION, SOLUTION INTRAVENOUS at 09:04

## 2022-04-01 RX ADMIN — ENOXAPARIN SODIUM 40 MG: 40 INJECTION SUBCUTANEOUS at 04:04

## 2022-04-01 RX ADMIN — ONDANSETRON 4 MG: 2 INJECTION INTRAMUSCULAR; INTRAVENOUS at 06:04

## 2022-04-01 RX ADMIN — PIPERACILLIN SODIUM AND TAZOBACTAM SODIUM 4.5 G: 4; .5 INJECTION, POWDER, FOR SOLUTION INTRAVENOUS at 11:04

## 2022-04-01 RX ADMIN — PIPERACILLIN SODIUM AND TAZOBACTAM SODIUM 4.5 G: 4; .5 INJECTION, POWDER, FOR SOLUTION INTRAVENOUS at 06:04

## 2022-04-01 RX ADMIN — PIPERACILLIN SODIUM AND TAZOBACTAM SODIUM 4.5 G: 4; .5 INJECTION, POWDER, FOR SOLUTION INTRAVENOUS at 02:04

## 2022-04-01 RX ADMIN — HYDROMORPHONE HYDROCHLORIDE 0.5 MG: 1 INJECTION, SOLUTION INTRAMUSCULAR; INTRAVENOUS; SUBCUTANEOUS at 05:04

## 2022-04-01 RX ADMIN — SODIUM CHLORIDE, SODIUM LACTATE, POTASSIUM CHLORIDE, AND CALCIUM CHLORIDE: .6; .31; .03; .02 INJECTION, SOLUTION INTRAVENOUS at 07:04

## 2022-04-01 RX ADMIN — SODIUM CHLORIDE, SODIUM LACTATE, POTASSIUM CHLORIDE, AND CALCIUM CHLORIDE: .6; .31; .03; .02 INJECTION, SOLUTION INTRAVENOUS at 02:04

## 2022-04-01 RX ADMIN — SODIUM CHLORIDE, SODIUM LACTATE, POTASSIUM CHLORIDE, AND CALCIUM CHLORIDE: .6; .31; .03; .02 INJECTION, SOLUTION INTRAVENOUS at 09:04

## 2022-04-01 RX ADMIN — HYDROMORPHONE HYDROCHLORIDE 0.5 MG: 1 INJECTION, SOLUTION INTRAMUSCULAR; INTRAVENOUS; SUBCUTANEOUS at 02:04

## 2022-04-01 RX ADMIN — IOHEXOL 130 ML: 350 INJECTION, SOLUTION INTRAVENOUS at 08:04

## 2022-04-01 NOTE — NURSING TRANSFER
Patient left via transport to x-ray. Pt AAOX4, VSS on RA. IVF infusing per MAR. KESHAV drain intact.     21-Nov-2017

## 2022-04-01 NOTE — PLAN OF CARE
Plan of care reviewed with patient.       Alert and oriented x 4. Pt was less forgetful this shift with friendly reminders placed on her board. Pain managed with prn Dilaudid Q4. SPO2 >95% on room air. Pt ambulates with supervision to bathroom. WC        Problem: Adult Inpatient Plan of Care  Goal: Plan of Care Review  Outcome: Ongoing, Progressing  Goal: Patient-Specific Goal (Individualized)  Outcome: Ongoing, Progressing  Goal: Absence of Hospital-Acquired Illness or Injury  Outcome: Ongoing, Progressing  Goal: Optimal Comfort and Wellbeing  Outcome: Ongoing, Progressing  Goal: Readiness for Transition of Care  Outcome: Ongoing, Progressing     Problem: Bariatric Environmental Safety  Goal: Safety Maintained with Care  Outcome: Ongoing, Progressing     Problem: Fall Injury Risk  Goal: Absence of Fall and Fall-Related Injury  Outcome: Ongoing, Progressing

## 2022-04-01 NOTE — PT/OT/SLP PROGRESS
Physical Therapy Treatment    Patient Name:  Geraldine Willson   MRN:  [id number]    Recommendations:     Discharge Recommendations:  outpatient PT   Discharge Equipment Recommendations: none   Barriers to discharge: None    Assessment:     Geraldine Willson is a 56 y.o. female admitted with a medical diagnosis of Pneumoperitoneum.  She presents with the following impairments/functional limitations:  weakness, impaired endurance, impaired self care skills, impaired functional mobilty, gait instability, impaired balance Pt. cooperative and tolerated treatment well. Pt. progressing with mobility.    Rehab Prognosis: Good; patient would benefit from acute skilled PT services to address these deficits and reach maximum level of function.    Recent Surgery: Procedure(s) (LRB):  LAPAROSCOPY, DIAGNOSTIC - egd, laparascopic repair of ulcer with patch (N/A) 2 Days Post-Op    Plan:     During this hospitalization, patient to be seen 4 x/week to address the identified rehab impairments via gait training, therapeutic activities and progress toward the following goals:    · Plan of Care Expires:  04/30/22    Subjective     Chief Complaint: weakness  Patient/Family Comments/goals: pt. Agreeable to PT  Pain/Comfort:  · Pain Rating 1:  (pt. did not rate)  · Location 1: abdomen      Objective:     Communicated with nursing prior to session.  Patient found supine with KESHAV drain, peripheral IV upon PT entry to room.     General Precautions: Standard, fall   Orthopedic Precautions:N/A   Braces: N/A  Respiratory Status: Room air     Functional Mobility:  · Bed Mobility:     · Rolling Left:  supervision  · Scooting: supervision  · Supine to Sit: supervision  · Sit to Supine: supervision  · Transfers:     · Sit to Stand:  supervision with no AD  · Gait: 250' with Supervision without AD or LOB. Pt. occasionally used hallway handrail for support/security at times.   · Balance: fair+  · Stairs:  Pt ascended/descended 4 stair(s) with No Assistive  Device with left handrail with Stand-by Assistance.       AM-PAC 6 CLICK MOBILITY  Turning over in bed (including adjusting bedclothes, sheets and blankets)?: 4  Sitting down on and standing up from a chair with arms (e.g., wheelchair, bedside commode, etc.): 3  Moving from lying on back to sitting on the side of the bed?: 4  Moving to and from a bed to a chair (including a wheelchair)?: 3  Need to walk in hospital room?: 3  Climbing 3-5 steps with a railing?: 3  Basic Mobility Total Score: 20       Therapeutic Activities and Exercises:   Discussed pt.'s progress, goals, safety with stair climbing, and POC.    Patient left supine with all lines intact and call button in reach..    GOALS:   Multidisciplinary Problems     Physical Therapy Goals        Problem: Physical Therapy    Goal Priority Disciplines Outcome Goal Variances Interventions   Physical Therapy Goal     PT, PT/OT Ongoing, Progressing     Description: Goals to be met by: 4/10/22    Patient will increase functional independence with mobility by performin. Supine to sit with Stand-by Assistance  2. Sit to supine with Stand-by Assistance  3. Sit to stand transfer with Stand-by Assistance  4. Gait  x 100 feet with Stand-by Assistance using LRAD.  5. Ascend/descend 5 stairs with left Handrails Contact Guard Assistance using No Assistive Device.                      Time Tracking:     PT Received On: 22  PT Start Time: 1110     PT Stop Time: 1125  PT Total Time (min): 15 min     Billable Minutes: Gait Training 15    Treatment Type: Treatment  PT/PTA: PT     PTA Visit Number: 0     2022

## 2022-04-01 NOTE — PROGRESS NOTES
"Darell Ryan - [facility]  General Surgery  Progress Note    Subjective:     History of Present Illness:  Geraldine Willson is a 55 yo female with a history of RNYGB in 2011 with Sanya and recent knee surgery who presents as a transfer for pneumoperitoneum. She reports acute onset of pain starting at 9pm last night. At first thought she was having a heart attack because pain was located in the upper abdomen and chest. Pain continued and migrated to her lower abdomen as well. Never had this type of pain before. Associated nausea, no emesis. Has loose bowel movement yesterday- no blood or melena. No fevers or chills, sob, constipation. Last PO intake yesterday at 2pm. She also reports that she has been taking approx 800-1000mg Ibuprofen daily since her knee surgery about 2 months ago. She was previously taking eliquis after knee surgery but stopped it 1 month ago.   AT OSH, WBC 15. CT scan with evidence of pneumoperitoneum at OSH with concern for GJ perforation.     Abdominal surgeries: lap RNYGB, c-sections   Anticoagulation: none       Post-Op Info:  Procedure(s) (LRB):  LAPAROSCOPY, DIAGNOSTIC - egd, laparascopic repair of ulcer with patch (N/A)   2 Days Post-Op     Interval History: NAEON, pain well controlled, ambulating, denies N/V, Af, HDS    Medications:  Continuous Infusions:   lactated ringers 150 mL/hr at 04/01/22 0744     Scheduled Meds:   enoxaparin  40 mg Subcutaneous Daily    fluconazole (DIFLUCAN) IV (PEDS and ADULTS)  200 mg Intravenous Q24H    piperacillin-tazobactam (ZOSYN) IVPB  4.5 g Intravenous Q8H     PRN Meds:HYDROmorphone, ondansetron, sodium chloride 0.9%     Review of patient's allergies indicates:   Allergen Reactions    Latuda [lurasidone] Other (See Comments)     "Makes my brain bad"    Lyrica [pregabalin] Other (See Comments)     "Makes my brain bad"     Objective:     Vital Signs (Most Recent):  Temp: 98.4 °F (36.9 °C) (04/01/22 0827)  Pulse: 95 (04/01/22 0827)  Resp: 16 (04/01/22 " 0827)  BP: (!) 160/70 (04/01/22 0827)  SpO2: (!) 92 % (04/01/22 0827)   Vital Signs (24h Range):  Temp:  [97.3 °F (36.3 °C)-98.6 °F (37 °C)] 98.4 °F (36.9 °C)  Pulse:  [] 95  Resp:  [16-20] 16  SpO2:  [90 %-96 %] 92 %  BP: (126-160)/(59-73) 160/70     Weight: 123.4 kg (272 lb 0.8 oz)  Body mass index is 46.67 kg/m².    Intake/Output - Last 3 Shifts         03/30 0700 03/31 0659 03/31 0700 04/01 0659 04/01 0700 04/02 0659    IV Piggyback 800      Total Intake(mL/kg) 800 (6.5)      Drains 50 40     Total Output 50 40     Net +750 -40            Urine Occurrence   1 x    Stool Occurrence 0 x 0 x 1 x            Physical Exam  Vitals and nursing note reviewed.   Constitutional:       General: She is not in acute distress.     Appearance: Normal appearance. She is not ill-appearing, toxic-appearing or diaphoretic.   Cardiovascular:      Rate and Rhythm: Normal rate and regular rhythm.      Pulses: Normal pulses.   Pulmonary:      Effort: Pulmonary effort is normal. No respiratory distress.   Abdominal:      General: There is no distension.      Palpations: Abdomen is soft.      Tenderness: There is abdominal tenderness. There is no guarding.      Hernia: No hernia is present.      Comments: Abd soft, appropriately tender, non-distended, incisions CDI, steristrips in place   Musculoskeletal:         General: Normal range of motion.   Skin:     General: Skin is warm.      Capillary Refill: Capillary refill takes less than 2 seconds.      Coloration: Skin is not jaundiced.   Neurological:      General: No focal deficit present.      Mental Status: She is alert and oriented to person, place, and time.       Significant Labs:  CBC:   Recent Labs   Lab 04/01/22 0253   WBC 7.75   RBC 3.64*   HGB 10.4*   HCT 33.6*      MCV 92   MCH 28.6   MCHC 31.0*     CMP:   Recent Labs   Lab 04/01/22 0253   GLU 77   CALCIUM 9.1      K 4.3   CO2 22*      BUN 29*   CREATININE 1.1       Significant Diagnostics:  I  have reviewed all pertinent imaging results/findings within the past 24 hours.    Assessment/Plan:     * Pneumoperitoneum  55 yo female with a history of RNYGB in 2011 with [place] and recent increased NSAID use due to knee surgery found to have a perforated marginal ulcer s/p lap repair and omental patch repair of her ulcer    -Strict NPO   -mIVF   - IV abx and fluc  - PT/OT, encourage ambulation  -Hold home meds   -PRN pain and nausea control    Will plan for upper GI study today, if no leak identified will plan to start CLD tomorrow        Emigdio Bower MD  General Surgery  [facility]

## 2022-04-01 NOTE — PLAN OF CARE
POC reviewed with patient who verbalized understanding. VSS on RA. AAOX4. Remains free of falls and injury. Patient up to ambulate in hallways x 1    - Upper GI fluoroscopy completed  - 4 x lap sites w/steri strips  - LLQ KESHAV drain, minimal serosanguinous output  - mIVF  - IV ABX  - 3 x BM    NPO, denies nausea. Pain controlled with PRN medications per MAR ATC. Educated on IS use. Patient denies chest pain & SOB. No acute events. No distress noted. Bed in lowest position, call light within reach, frequent rounds made for safety.     WCTM

## 2022-04-01 NOTE — SUBJECTIVE & OBJECTIVE
"Interval History: NAEON, pain well controlled, ambulating, denies N/V, Af, HDS    Medications:  Continuous Infusions:   lactated ringers 150 mL/hr at 04/01/22 0744     Scheduled Meds:   enoxaparin  40 mg Subcutaneous Daily    fluconazole (DIFLUCAN) IV (PEDS and ADULTS)  200 mg Intravenous Q24H    piperacillin-tazobactam (ZOSYN) IVPB  4.5 g Intravenous Q8H     PRN Meds:HYDROmorphone, ondansetron, sodium chloride 0.9%     Review of patient's allergies indicates:   Allergen Reactions    Latuda [lurasidone] Other (See Comments)     "Makes my brain bad"    Lyrica [pregabalin] Other (See Comments)     "Makes my brain bad"     Objective:     Vital Signs (Most Recent):  Temp: 98.4 °F (36.9 °C) (04/01/22 0827)  Pulse: 95 (04/01/22 0827)  Resp: 16 (04/01/22 0827)  BP: (!) 160/70 (04/01/22 0827)  SpO2: (!) 92 % (04/01/22 0827)   Vital Signs (24h Range):  Temp:  [97.3 °F (36.3 °C)-98.6 °F (37 °C)] 98.4 °F (36.9 °C)  Pulse:  [] 95  Resp:  [16-20] 16  SpO2:  [90 %-96 %] 92 %  BP: (126-160)/(59-73) 160/70     Weight: 123.4 kg (272 lb 0.8 oz)  Body mass index is 46.67 kg/m².    Intake/Output - Last 3 Shifts         03/30 0700  03/31 0659 03/31 0700 04/01 0659 04/01 0700 04/02 0659    IV Piggyback 800      Total Intake(mL/kg) 800 (6.5)      Drains 50 40     Total Output 50 40     Net +750 -40            Urine Occurrence   1 x    Stool Occurrence 0 x 0 x 1 x            Physical Exam  Vitals and nursing note reviewed.   Constitutional:       General: She is not in acute distress.     Appearance: Normal appearance. She is not ill-appearing, toxic-appearing or diaphoretic.   Cardiovascular:      Rate and Rhythm: Normal rate and regular rhythm.      Pulses: Normal pulses.   Pulmonary:      Effort: Pulmonary effort is normal. No respiratory distress.   Abdominal:      General: There is no distension.      Palpations: Abdomen is soft.      Tenderness: There is abdominal tenderness. There is no guarding.      Hernia: No hernia is " present.      Comments: Abd soft, appropriately tender, non-distended, incisions CDI, steristrips in place   Musculoskeletal:         General: Normal range of motion.   Skin:     General: Skin is warm.      Capillary Refill: Capillary refill takes less than 2 seconds.      Coloration: Skin is not jaundiced.   Neurological:      General: No focal deficit present.      Mental Status: She is alert and oriented to person, place, and time.       Significant Labs:  CBC:   Recent Labs   Lab 04/01/22  0253   WBC 7.75   RBC 3.64*   HGB 10.4*   HCT 33.6*      MCV 92   MCH 28.6   MCHC 31.0*     CMP:   Recent Labs   Lab 04/01/22  0253   GLU 77   CALCIUM 9.1      K 4.3   CO2 22*      BUN 29*   CREATININE 1.1       Significant Diagnostics:  I have reviewed all pertinent imaging results/findings within the past 24 hours.

## 2022-04-01 NOTE — ASSESSMENT & PLAN NOTE
55 yo female with a history of RNYGB in 2011 with [place] and recent increased NSAID use due to knee surgery found to have a perforated marginal ulcer s/p lap repair and omental patch repair of her ulcer    -Strict NPO   -mIVF   - IV abx and fluc  - PT/OT, encourage ambulation  -Hold home meds   -PRN pain and nausea control    Will plan for upper GI study today, if no leak identified will plan to start CLD tomorrow

## 2022-04-02 LAB
ANION GAP SERPL CALC-SCNC: 11 MMOL/L (ref 8–16)
BASOPHILS # BLD AUTO: 0.02 K/UL (ref 0–0.2)
BASOPHILS NFR BLD: 0.4 % (ref 0–1.9)
BUN SERPL-MCNC: 17 MG/DL (ref 6–20)
CALCIUM SERPL-MCNC: 8.9 MG/DL (ref 8.7–10.5)
CHLORIDE SERPL-SCNC: 106 MMOL/L (ref 95–110)
CO2 SERPL-SCNC: 21 MMOL/L (ref 23–29)
CREAT SERPL-MCNC: 0.7 MG/DL (ref 0.5–1.4)
DIFFERENTIAL METHOD: ABNORMAL
EOSINOPHIL # BLD AUTO: 0.1 K/UL (ref 0–0.5)
EOSINOPHIL NFR BLD: 2.4 % (ref 0–8)
ERYTHROCYTE [DISTWIDTH] IN BLOOD BY AUTOMATED COUNT: 14.3 % (ref 11.5–14.5)
EST. GFR  (AFRICAN AMERICAN): >60 ML/MIN/1.73 M^2
EST. GFR  (NON AFRICAN AMERICAN): >60 ML/MIN/1.73 M^2
GLUCOSE SERPL-MCNC: 65 MG/DL (ref 70–110)
HCT VFR BLD AUTO: 29.9 % (ref 37–48.5)
HGB BLD-MCNC: 9.5 G/DL (ref 12–16)
IMM GRANULOCYTES # BLD AUTO: 0.01 K/UL (ref 0–0.04)
IMM GRANULOCYTES NFR BLD AUTO: 0.2 % (ref 0–0.5)
LYMPHOCYTES # BLD AUTO: 0.5 K/UL (ref 1–4.8)
LYMPHOCYTES NFR BLD: 9.4 % (ref 18–48)
MAGNESIUM SERPL-MCNC: 1.5 MG/DL (ref 1.6–2.6)
MCH RBC QN AUTO: 28.4 PG (ref 27–31)
MCHC RBC AUTO-ENTMCNC: 31.8 G/DL (ref 32–36)
MCV RBC AUTO: 89 FL (ref 82–98)
MONOCYTES # BLD AUTO: 0.3 K/UL (ref 0.3–1)
MONOCYTES NFR BLD: 6 % (ref 4–15)
NEUTROPHILS # BLD AUTO: 4.1 K/UL (ref 1.8–7.7)
NEUTROPHILS NFR BLD: 81.6 % (ref 38–73)
NRBC BLD-RTO: 0 /100 WBC
PHOSPHATE SERPL-MCNC: 2.8 MG/DL (ref 2.7–4.5)
PLATELET # BLD AUTO: 166 K/UL (ref 150–450)
PMV BLD AUTO: 11.1 FL (ref 9.2–12.9)
POTASSIUM SERPL-SCNC: 4.2 MMOL/L (ref 3.5–5.1)
RBC # BLD AUTO: 3.35 M/UL (ref 4–5.4)
SODIUM SERPL-SCNC: 138 MMOL/L (ref 136–145)
WBC # BLD AUTO: 5.01 K/UL (ref 3.9–12.7)

## 2022-04-02 PROCEDURE — 80048 BASIC METABOLIC PNL TOTAL CA: CPT | Performed by: STUDENT IN AN ORGANIZED HEALTH CARE EDUCATION/TRAINING PROGRAM

## 2022-04-02 PROCEDURE — 63600175 PHARM REV CODE 636 W HCPCS: Performed by: STUDENT IN AN ORGANIZED HEALTH CARE EDUCATION/TRAINING PROGRAM

## 2022-04-02 PROCEDURE — 36415 COLL VENOUS BLD VENIPUNCTURE: CPT | Performed by: STUDENT IN AN ORGANIZED HEALTH CARE EDUCATION/TRAINING PROGRAM

## 2022-04-02 PROCEDURE — 63600175 PHARM REV CODE 636 W HCPCS

## 2022-04-02 PROCEDURE — 20600001 HC STEP DOWN PRIVATE ROOM

## 2022-04-02 PROCEDURE — 84100 ASSAY OF PHOSPHORUS: CPT | Performed by: STUDENT IN AN ORGANIZED HEALTH CARE EDUCATION/TRAINING PROGRAM

## 2022-04-02 PROCEDURE — 25000003 PHARM REV CODE 250: Performed by: STUDENT IN AN ORGANIZED HEALTH CARE EDUCATION/TRAINING PROGRAM

## 2022-04-02 PROCEDURE — 83735 ASSAY OF MAGNESIUM: CPT | Performed by: STUDENT IN AN ORGANIZED HEALTH CARE EDUCATION/TRAINING PROGRAM

## 2022-04-02 PROCEDURE — 85025 COMPLETE CBC W/AUTO DIFF WBC: CPT | Performed by: STUDENT IN AN ORGANIZED HEALTH CARE EDUCATION/TRAINING PROGRAM

## 2022-04-02 RX ORDER — HEPARIN SODIUM 5000 [USP'U]/ML
5000 INJECTION, SOLUTION INTRAVENOUS; SUBCUTANEOUS EVERY 8 HOURS
Status: DISCONTINUED | OUTPATIENT
Start: 2022-04-02 | End: 2022-04-04 | Stop reason: HOSPADM

## 2022-04-02 RX ORDER — HYDRALAZINE HYDROCHLORIDE 20 MG/ML
10 INJECTION INTRAMUSCULAR; INTRAVENOUS EVERY 6 HOURS PRN
Status: DISCONTINUED | OUTPATIENT
Start: 2022-04-02 | End: 2022-04-04 | Stop reason: HOSPADM

## 2022-04-02 RX ADMIN — ONDANSETRON 4 MG: 2 INJECTION INTRAMUSCULAR; INTRAVENOUS at 10:04

## 2022-04-02 RX ADMIN — HYDROMORPHONE HYDROCHLORIDE 0.5 MG: 1 INJECTION, SOLUTION INTRAMUSCULAR; INTRAVENOUS; SUBCUTANEOUS at 10:04

## 2022-04-02 RX ADMIN — HYDROMORPHONE HYDROCHLORIDE 0.5 MG: 1 INJECTION, SOLUTION INTRAMUSCULAR; INTRAVENOUS; SUBCUTANEOUS at 01:04

## 2022-04-02 RX ADMIN — SODIUM CHLORIDE, SODIUM LACTATE, POTASSIUM CHLORIDE, AND CALCIUM CHLORIDE: .6; .31; .03; .02 INJECTION, SOLUTION INTRAVENOUS at 02:04

## 2022-04-02 RX ADMIN — ONDANSETRON 4 MG: 2 INJECTION INTRAMUSCULAR; INTRAVENOUS at 04:04

## 2022-04-02 RX ADMIN — FLUCONAZOLE 200 MG: 2 INJECTION, SOLUTION INTRAVENOUS at 10:04

## 2022-04-02 RX ADMIN — ONDANSETRON 4 MG: 2 INJECTION INTRAMUSCULAR; INTRAVENOUS at 09:04

## 2022-04-02 RX ADMIN — PIPERACILLIN SODIUM AND TAZOBACTAM SODIUM 4.5 G: 4; .5 INJECTION, POWDER, FOR SOLUTION INTRAVENOUS at 06:04

## 2022-04-02 RX ADMIN — PIPERACILLIN SODIUM AND TAZOBACTAM SODIUM 4.5 G: 4; .5 INJECTION, POWDER, FOR SOLUTION INTRAVENOUS at 01:04

## 2022-04-02 RX ADMIN — PIPERACILLIN SODIUM AND TAZOBACTAM SODIUM 4.5 G: 4; .5 INJECTION, POWDER, FOR SOLUTION INTRAVENOUS at 10:04

## 2022-04-02 RX ADMIN — HEPARIN SODIUM 5000 UNITS: 5000 INJECTION INTRAVENOUS; SUBCUTANEOUS at 09:04

## 2022-04-02 RX ADMIN — HYDROMORPHONE HYDROCHLORIDE 0.5 MG: 1 INJECTION, SOLUTION INTRAMUSCULAR; INTRAVENOUS; SUBCUTANEOUS at 02:04

## 2022-04-02 RX ADMIN — HEPARIN SODIUM 5000 UNITS: 5000 INJECTION INTRAVENOUS; SUBCUTANEOUS at 02:04

## 2022-04-02 RX ADMIN — SODIUM CHLORIDE, SODIUM LACTATE, POTASSIUM CHLORIDE, AND CALCIUM CHLORIDE: .6; .31; .03; .02 INJECTION, SOLUTION INTRAVENOUS at 03:04

## 2022-04-02 RX ADMIN — HYDRALAZINE HYDROCHLORIDE 10 MG: 20 INJECTION, SOLUTION INTRAMUSCULAR; INTRAVENOUS at 04:04

## 2022-04-02 RX ADMIN — HYDROMORPHONE HYDROCHLORIDE 0.5 MG: 1 INJECTION, SOLUTION INTRAMUSCULAR; INTRAVENOUS; SUBCUTANEOUS at 06:04

## 2022-04-02 RX ADMIN — HYDRALAZINE HYDROCHLORIDE 10 MG: 20 INJECTION, SOLUTION INTRAMUSCULAR; INTRAVENOUS at 10:04

## 2022-04-02 RX ADMIN — AMITRIPTYLINE HYDROCHLORIDE 75 MG: 25 TABLET, FILM COATED ORAL at 09:04

## 2022-04-02 NOTE — PLAN OF CARE
POC reviewed with patient who verbalized understanding. AAOX4. Remains free of falls and injury. Patient up to ambulate in hallways x 1     - VSS on RA, except elevated BP, controlled with PRN medications   - 4 x lap sites w/steri strips  - LLQ KESHAV drain, minimal serosanguinous output  - mIVF  - IV ABX     Tolerating clear liquid diet.  Nausea and pain controlled with PRN medications per MAR ATC. Educated on IS use. Patient denies chest pain & SOB. No acute events. No distress noted. Bed in lowest position, call light within reach, frequent rounds made for safety.      WC     Problem: Bariatric Environmental Safety  Goal: Safety Maintained with Care  Outcome: Ongoing, Progressing     Problem: Fall Injury Risk  Goal: Absence of Fall and Fall-Related Injury  Outcome: Ongoing, Progressing

## 2022-04-02 NOTE — PLAN OF CARE
Pt is alert/oriented x4.  Pain is managed with prn Dilaudid Q4. Pt expresses feeling depressed and anxious. Pt requested to be placed on her at home meds. MD overnight was made aware. No new orders placed. Report given to day shift RN. JAYDEN                Problem: Adult Inpatient Plan of Care  Goal: Plan of Care Review  Outcome: Ongoing, Progressing  Goal: Patient-Specific Goal (Individualized)  Outcome: Ongoing, Progressing  Goal: Absence of Hospital-Acquired Illness or Injury  Outcome: Ongoing, Progressing  Goal: Optimal Comfort and Wellbeing  Outcome: Ongoing, Progressing  Goal: Readiness for Transition of Care  Outcome: Ongoing, Progressing     Problem: Bariatric Environmental Safety  Goal: Safety Maintained with Care  Outcome: Ongoing, Progressing     Problem: Fall Injury Risk  Goal: Absence of Fall and Fall-Related Injury  Outcome: Ongoing, Progressing

## 2022-04-02 NOTE — ASSESSMENT & PLAN NOTE
55 yo female with a history of RNYGB in 2011 with [place] and recent increased NSAID use due to knee surgery found to have a perforated marginal ulcer s/p lap repair and omental patch repair of her ulcer.  UGI on 4/1/22 with no evidence of persistent leak.    -CLD  -mIVF   - IV abx and fluc  - PT/OT, encourage ambulation  -will reorder some home meds  -PRN pain and nausea control    Will plan for upper GI study today, if no leak identified will plan to start CLD tomorrow

## 2022-04-02 NOTE — PROGRESS NOTES
"[facility] - [facility]  General Surgery  Progress Note    Subjective:     History of Present Illness:  Geraldine Willson is a 55 yo female with a history of RNYGB in 2011 with [place] and recent knee surgery who presents as a transfer for pneumoperitoneum. She reports acute onset of pain starting at 9pm last night. At first thought she was having a heart attack because pain was located in the upper abdomen and chest. Pain continued and migrated to her lower abdomen as well. Never had this type of pain before. Associated nausea, no emesis. Has loose bowel movement yesterday- no blood or melena. No fevers or chills, sob, constipation. Last PO intake yesterday at 2pm. She also reports that she has been taking approx 800-1000mg Ibuprofen daily since her knee surgery about 2 months ago. She was previously taking eliquis after knee surgery but stopped it 1 month ago.   AT OSH, WBC 15. CT scan with evidence of pneumoperitoneum at OSH with concern for GJ perforation.     Abdominal surgeries: lap RNYGB, c-sections   Anticoagulation: none       Post-Op Info:  Procedure(s) (LRB):  LAPAROSCOPY, DIAGNOSTIC - egd, laparascopic repair of ulcer with patch (N/A)   3 Days Post-Op     Interval History: NAEON, pain well controlled, ambulating, not having nausea or vomiting, HDS, afebrile    Medications:  Continuous Infusions:   lactated ringers 150 mL/hr at 04/02/22 0345     Scheduled Meds:   fluconazole (DIFLUCAN) IV (PEDS and ADULTS)  200 mg Intravenous Q24H    heparin (porcine)  5,000 Units Subcutaneous Q8H    piperacillin-tazobactam (ZOSYN) IVPB  4.5 g Intravenous Q8H     PRN Meds:HYDROmorphone, ondansetron, sodium chloride 0.9%     Review of patient's allergies indicates:   Allergen Reactions    Latuda [lurasidone] Other (See Comments)     "Makes my brain bad"    Lyrica [pregabalin] Other (See Comments)     "Makes my brain bad"     Objective:     Vital Signs (Most Recent):  Temp: 98.2 °F (36.8 °C) (04/02/22 1154)  Pulse: 96 (04/02/22 " 1154)  Resp: 20 (04/02/22 1154)  BP: (!) 186/83 (04/02/22 1154)  SpO2: 96 % (04/02/22 1154)   Vital Signs (24h Range):  Temp:  [97.7 °F (36.5 °C)-98.7 °F (37.1 °C)] 98.2 °F (36.8 °C)  Pulse:  [] 96  Resp:  [16-20] 20  SpO2:  [92 %-96 %] 96 %  BP: (137-186)/(65-93) 186/83     Weight: 119.9 kg (264 lb 5.3 oz)  Body mass index is 45.37 kg/m².    Intake/Output - Last 3 Shifts         03/31 0700  04/01 0659 04/01 0700 04/02 0659 04/02 0700 04/03 0659    P.O.  0     I.V. (mL/kg)  4751.5 (39.6)     IV Piggyback  488.6     Total Intake(mL/kg)  5240.1 (43.7)     Urine (mL/kg/hr)  450 (0.2)     Drains 40 25     Stool  0     Total Output 40 475     Net -40 +4765.1            Urine Occurrence  1 x     Stool Occurrence 0 x 4 x 0 x            Physical Exam  Vitals and nursing note reviewed.   Constitutional:       General: She is not in acute distress.     Appearance: Normal appearance. She is not ill-appearing, toxic-appearing or diaphoretic.   Cardiovascular:      Rate and Rhythm: Normal rate and regular rhythm.      Pulses: Normal pulses.   Pulmonary:      Effort: Pulmonary effort is normal. No respiratory distress.   Abdominal:      General: There is no distension.      Palpations: Abdomen is soft.      Tenderness: There is abdominal tenderness. There is no guarding.      Hernia: No hernia is present.      Comments: Abd soft, appropriately tender, non-distended, incisions CDI, steristrips in place   Musculoskeletal:         General: Normal range of motion.   Skin:     General: Skin is warm.      Capillary Refill: Capillary refill takes less than 2 seconds.      Coloration: Skin is not jaundiced.   Neurological:      General: No focal deficit present.      Mental Status: She is alert and oriented to person, place, and time.       Significant Labs:  CBC:   Recent Labs   Lab 04/02/22  0513   WBC 5.01   RBC 3.35*   HGB 9.5*   HCT 29.9*      MCV 89   MCH 28.4   MCHC 31.8*       CMP:   Recent Labs   Lab  04/02/22  0513   GLU 65*   CALCIUM 8.9      K 4.2   CO2 21*      BUN 17   CREATININE 0.7         Significant Diagnostics:  I have reviewed all pertinent imaging results/findings within the past 24 hours.    Assessment/Plan:     * Pneumoperitoneum  55 yo female with a history of RNYGB in 2011 with [place] and recent increased NSAID use due to knee surgery found to have a perforated marginal ulcer s/p lap repair and omental patch repair of her ulcer.  UGI on 4/1/22 with no evidence of persistent leak.    -CLD  -mIVF   - IV abx and fluc  - PT/OT, encourage ambulation  -will reorder some home meds  -PRN pain and nausea control    Will plan for upper GI study today, if no leak identified will plan to start CLD tomorrow        Brendan Mendoza MD  General Surgery  [facility]

## 2022-04-02 NOTE — SUBJECTIVE & OBJECTIVE
"Interval History: NAEON, pain well controlled, ambulating, not having nausea or vomiting, HDS, afebrile    Medications:  Continuous Infusions:   lactated ringers 150 mL/hr at 04/02/22 0345     Scheduled Meds:   fluconazole (DIFLUCAN) IV (PEDS and ADULTS)  200 mg Intravenous Q24H    heparin (porcine)  5,000 Units Subcutaneous Q8H    piperacillin-tazobactam (ZOSYN) IVPB  4.5 g Intravenous Q8H     PRN Meds:HYDROmorphone, ondansetron, sodium chloride 0.9%     Review of patient's allergies indicates:   Allergen Reactions    Latuda [lurasidone] Other (See Comments)     "Makes my brain bad"    Lyrica [pregabalin] Other (See Comments)     "Makes my brain bad"     Objective:     Vital Signs (Most Recent):  Temp: 98.2 °F (36.8 °C) (04/02/22 1154)  Pulse: 96 (04/02/22 1154)  Resp: 20 (04/02/22 1154)  BP: (!) 186/83 (04/02/22 1154)  SpO2: 96 % (04/02/22 1154)   Vital Signs (24h Range):  Temp:  [97.7 °F (36.5 °C)-98.7 °F (37.1 °C)] 98.2 °F (36.8 °C)  Pulse:  [] 96  Resp:  [16-20] 20  SpO2:  [92 %-96 %] 96 %  BP: (137-186)/(65-93) 186/83     Weight: 119.9 kg (264 lb 5.3 oz)  Body mass index is 45.37 kg/m².    Intake/Output - Last 3 Shifts         03/31 0700  04/01 0659 04/01 0700  04/02 0659 04/02 0700  04/03 0659    P.O.  0     I.V. (mL/kg)  4751.5 (39.6)     IV Piggyback  488.6     Total Intake(mL/kg)  5240.1 (43.7)     Urine (mL/kg/hr)  450 (0.2)     Drains 40 25     Stool  0     Total Output 40 475     Net -40 +4765.1            Urine Occurrence  1 x     Stool Occurrence 0 x 4 x 0 x            Physical Exam  Vitals and nursing note reviewed.   Constitutional:       General: She is not in acute distress.     Appearance: Normal appearance. She is not ill-appearing, toxic-appearing or diaphoretic.   Cardiovascular:      Rate and Rhythm: Normal rate and regular rhythm.      Pulses: Normal pulses.   Pulmonary:      Effort: Pulmonary effort is normal. No respiratory distress.   Abdominal:      General: There is no distension. "      Palpations: Abdomen is soft.      Tenderness: There is abdominal tenderness. There is no guarding.      Hernia: No hernia is present.      Comments: Abd soft, appropriately tender, non-distended, incisions CDI, steristrips in place   Musculoskeletal:         General: Normal range of motion.   Skin:     General: Skin is warm.      Capillary Refill: Capillary refill takes less than 2 seconds.      Coloration: Skin is not jaundiced.   Neurological:      General: No focal deficit present.      Mental Status: She is alert and oriented to person, place, and time.       Significant Labs:  CBC:   Recent Labs   Lab 04/02/22  0513   WBC 5.01   RBC 3.35*   HGB 9.5*   HCT 29.9*      MCV 89   MCH 28.4   MCHC 31.8*       CMP:   Recent Labs   Lab 04/02/22  0513   GLU 65*   CALCIUM 8.9      K 4.2   CO2 21*      BUN 17   CREATININE 0.7         Significant Diagnostics:  I have reviewed all pertinent imaging results/findings within the past 24 hours.

## 2022-04-02 NOTE — PROGRESS NOTES
BP elevated at 177/78. MD on-call notified. PRN hydralazine ordered and administered. BP lowered to 157/69.   WCTM

## 2022-04-03 LAB
ANION GAP SERPL CALC-SCNC: 14 MMOL/L (ref 8–16)
ANISOCYTOSIS BLD QL SMEAR: SLIGHT
BASOPHILS # BLD AUTO: 0.03 K/UL (ref 0–0.2)
BASOPHILS NFR BLD: 0.6 % (ref 0–1.9)
BUN SERPL-MCNC: 9 MG/DL (ref 6–20)
CALCIUM SERPL-MCNC: 8.9 MG/DL (ref 8.7–10.5)
CHLORIDE SERPL-SCNC: 106 MMOL/L (ref 95–110)
CO2 SERPL-SCNC: 17 MMOL/L (ref 23–29)
CREAT SERPL-MCNC: 0.7 MG/DL (ref 0.5–1.4)
DIFFERENTIAL METHOD: ABNORMAL
EOSINOPHIL # BLD AUTO: 0.1 K/UL (ref 0–0.5)
EOSINOPHIL NFR BLD: 2.9 % (ref 0–8)
ERYTHROCYTE [DISTWIDTH] IN BLOOD BY AUTOMATED COUNT: 14 % (ref 11.5–14.5)
EST. GFR  (AFRICAN AMERICAN): >60 ML/MIN/1.73 M^2
EST. GFR  (NON AFRICAN AMERICAN): >60 ML/MIN/1.73 M^2
GLUCOSE SERPL-MCNC: 67 MG/DL (ref 70–110)
HCT VFR BLD AUTO: 32 % (ref 37–48.5)
HGB BLD-MCNC: 10.3 G/DL (ref 12–16)
HYPOCHROMIA BLD QL SMEAR: ABNORMAL
IMM GRANULOCYTES # BLD AUTO: 0.05 K/UL (ref 0–0.04)
IMM GRANULOCYTES NFR BLD AUTO: 1 % (ref 0–0.5)
LYMPHOCYTES # BLD AUTO: 0.7 K/UL (ref 1–4.8)
LYMPHOCYTES NFR BLD: 14.6 % (ref 18–48)
MAGNESIUM SERPL-MCNC: 1.3 MG/DL (ref 1.6–2.6)
MCH RBC QN AUTO: 28.4 PG (ref 27–31)
MCHC RBC AUTO-ENTMCNC: 32.2 G/DL (ref 32–36)
MCV RBC AUTO: 88 FL (ref 82–98)
MONOCYTES # BLD AUTO: 0.4 K/UL (ref 0.3–1)
MONOCYTES NFR BLD: 8.8 % (ref 4–15)
NEUTROPHILS # BLD AUTO: 3.4 K/UL (ref 1.8–7.7)
NEUTROPHILS NFR BLD: 72.1 % (ref 38–73)
NRBC BLD-RTO: 0 /100 WBC
OVALOCYTES BLD QL SMEAR: ABNORMAL
PHOSPHATE SERPL-MCNC: 3.2 MG/DL (ref 2.7–4.5)
PLATELET # BLD AUTO: 157 K/UL (ref 150–450)
PMV BLD AUTO: ABNORMAL FL (ref 9.2–12.9)
POIKILOCYTOSIS BLD QL SMEAR: SLIGHT
POLYCHROMASIA BLD QL SMEAR: ABNORMAL
POTASSIUM SERPL-SCNC: 4.2 MMOL/L (ref 3.5–5.1)
RBC # BLD AUTO: 3.63 M/UL (ref 4–5.4)
SODIUM SERPL-SCNC: 137 MMOL/L (ref 136–145)
SPHEROCYTES BLD QL SMEAR: ABNORMAL
WBC # BLD AUTO: 4.78 K/UL (ref 3.9–12.7)

## 2022-04-03 PROCEDURE — 84100 ASSAY OF PHOSPHORUS: CPT | Performed by: STUDENT IN AN ORGANIZED HEALTH CARE EDUCATION/TRAINING PROGRAM

## 2022-04-03 PROCEDURE — 36415 COLL VENOUS BLD VENIPUNCTURE: CPT | Performed by: STUDENT IN AN ORGANIZED HEALTH CARE EDUCATION/TRAINING PROGRAM

## 2022-04-03 PROCEDURE — 63600175 PHARM REV CODE 636 W HCPCS: Performed by: STUDENT IN AN ORGANIZED HEALTH CARE EDUCATION/TRAINING PROGRAM

## 2022-04-03 PROCEDURE — 80048 BASIC METABOLIC PNL TOTAL CA: CPT | Performed by: STUDENT IN AN ORGANIZED HEALTH CARE EDUCATION/TRAINING PROGRAM

## 2022-04-03 PROCEDURE — 25000003 PHARM REV CODE 250: Performed by: STUDENT IN AN ORGANIZED HEALTH CARE EDUCATION/TRAINING PROGRAM

## 2022-04-03 PROCEDURE — 83735 ASSAY OF MAGNESIUM: CPT | Performed by: STUDENT IN AN ORGANIZED HEALTH CARE EDUCATION/TRAINING PROGRAM

## 2022-04-03 PROCEDURE — 85025 COMPLETE CBC W/AUTO DIFF WBC: CPT | Performed by: STUDENT IN AN ORGANIZED HEALTH CARE EDUCATION/TRAINING PROGRAM

## 2022-04-03 PROCEDURE — 63600175 PHARM REV CODE 636 W HCPCS

## 2022-04-03 PROCEDURE — 25000003 PHARM REV CODE 250

## 2022-04-03 PROCEDURE — 20600001 HC STEP DOWN PRIVATE ROOM

## 2022-04-03 RX ORDER — DIPHENHYDRAMINE HCL 25 MG
25 CAPSULE ORAL ONCE
Status: COMPLETED | OUTPATIENT
Start: 2022-04-03 | End: 2022-04-03

## 2022-04-03 RX ORDER — OXYCODONE HYDROCHLORIDE 10 MG/1
10 TABLET ORAL EVERY 6 HOURS PRN
Status: DISCONTINUED | OUTPATIENT
Start: 2022-04-03 | End: 2022-04-04 | Stop reason: HOSPADM

## 2022-04-03 RX ORDER — OXYCODONE HYDROCHLORIDE 5 MG/1
5 TABLET ORAL EVERY 6 HOURS PRN
Qty: 10 TABLET | Refills: 0 | Status: SHIPPED | OUTPATIENT
Start: 2022-04-03

## 2022-04-03 RX ORDER — LANOLIN ALCOHOL/MO/W.PET/CERES
400 CREAM (GRAM) TOPICAL ONCE
Status: COMPLETED | OUTPATIENT
Start: 2022-04-03 | End: 2022-04-03

## 2022-04-03 RX ORDER — ONDANSETRON 4 MG/1
8 TABLET, ORALLY DISINTEGRATING ORAL 2 TIMES DAILY
Qty: 30 TABLET | Refills: 0 | Status: SHIPPED | OUTPATIENT
Start: 2022-04-03

## 2022-04-03 RX ORDER — SODIUM,POTASSIUM PHOSPHATES 280-250MG
1 POWDER IN PACKET (EA) ORAL ONCE
Status: COMPLETED | OUTPATIENT
Start: 2022-04-03 | End: 2022-04-03

## 2022-04-03 RX ORDER — LABETALOL HCL 20 MG/4 ML
10 SYRINGE (ML) INTRAVENOUS EVERY 6 HOURS PRN
Status: DISCONTINUED | OUTPATIENT
Start: 2022-04-03 | End: 2022-04-04 | Stop reason: HOSPADM

## 2022-04-03 RX ORDER — OXYCODONE HYDROCHLORIDE 5 MG/1
5 TABLET ORAL EVERY 6 HOURS PRN
Status: DISCONTINUED | OUTPATIENT
Start: 2022-04-03 | End: 2022-04-04 | Stop reason: HOSPADM

## 2022-04-03 RX ADMIN — PIPERACILLIN SODIUM AND TAZOBACTAM SODIUM 4.5 G: 4; .5 INJECTION, POWDER, FOR SOLUTION INTRAVENOUS at 02:04

## 2022-04-03 RX ADMIN — HYDRALAZINE HYDROCHLORIDE 10 MG: 20 INJECTION, SOLUTION INTRAMUSCULAR; INTRAVENOUS at 04:04

## 2022-04-03 RX ADMIN — HYDRALAZINE HYDROCHLORIDE 10 MG: 20 INJECTION, SOLUTION INTRAMUSCULAR; INTRAVENOUS at 08:04

## 2022-04-03 RX ADMIN — Medication 400 MG: at 06:04

## 2022-04-03 RX ADMIN — DIPHENHYDRAMINE HYDROCHLORIDE 25 MG: 25 CAPSULE ORAL at 01:04

## 2022-04-03 RX ADMIN — LABETALOL HYDROCHLORIDE 10 MG: 5 INJECTION, SOLUTION INTRAVENOUS at 05:04

## 2022-04-03 RX ADMIN — HEPARIN SODIUM 5000 UNITS: 5000 INJECTION INTRAVENOUS; SUBCUTANEOUS at 05:04

## 2022-04-03 RX ADMIN — OXYCODONE HYDROCHLORIDE 10 MG: 10 TABLET ORAL at 03:04

## 2022-04-03 RX ADMIN — HYDRALAZINE HYDROCHLORIDE 10 MG: 20 INJECTION, SOLUTION INTRAMUSCULAR; INTRAVENOUS at 12:04

## 2022-04-03 RX ADMIN — HEPARIN SODIUM 5000 UNITS: 5000 INJECTION INTRAVENOUS; SUBCUTANEOUS at 10:04

## 2022-04-03 RX ADMIN — HYDROMORPHONE HYDROCHLORIDE 0.5 MG: 1 INJECTION, SOLUTION INTRAMUSCULAR; INTRAVENOUS; SUBCUTANEOUS at 06:04

## 2022-04-03 RX ADMIN — ONDANSETRON 4 MG: 2 INJECTION INTRAMUSCULAR; INTRAVENOUS at 10:04

## 2022-04-03 RX ADMIN — POTASSIUM & SODIUM PHOSPHATES POWDER PACK 280-160-250 MG 1 PACKET: 280-160-250 PACK at 06:04

## 2022-04-03 RX ADMIN — HEPARIN SODIUM 5000 UNITS: 5000 INJECTION INTRAVENOUS; SUBCUTANEOUS at 01:04

## 2022-04-03 RX ADMIN — AMITRIPTYLINE HYDROCHLORIDE 75 MG: 25 TABLET, FILM COATED ORAL at 08:04

## 2022-04-03 RX ADMIN — HYDROMORPHONE HYDROCHLORIDE 0.5 MG: 1 INJECTION, SOLUTION INTRAMUSCULAR; INTRAVENOUS; SUBCUTANEOUS at 02:04

## 2022-04-03 RX ADMIN — DIPHENHYDRAMINE HYDROCHLORIDE 25 MG: 25 CAPSULE ORAL at 08:04

## 2022-04-03 RX ADMIN — FLUCONAZOLE 200 MG: 2 INJECTION, SOLUTION INTRAVENOUS at 10:04

## 2022-04-03 RX ADMIN — PIPERACILLIN SODIUM AND TAZOBACTAM SODIUM 4.5 G: 4; .5 INJECTION, POWDER, FOR SOLUTION INTRAVENOUS at 06:04

## 2022-04-03 RX ADMIN — OXYCODONE HYDROCHLORIDE 10 MG: 10 TABLET ORAL at 10:04

## 2022-04-03 RX ADMIN — PIPERACILLIN SODIUM AND TAZOBACTAM SODIUM 4.5 G: 4; .5 INJECTION, POWDER, FOR SOLUTION INTRAVENOUS at 10:04

## 2022-04-03 RX ADMIN — OXYCODONE HYDROCHLORIDE 10 MG: 10 TABLET ORAL at 08:04

## 2022-04-03 NOTE — PROGRESS NOTES
"[facility] - [facility]  General Surgery  Progress Note    Subjective:     History of Present Illness:  Geraldine Willson is a 57 yo female with a history of RNYGB in 2011 with [place] and recent knee surgery who presents as a transfer for pneumoperitoneum. She reports acute onset of pain starting at 9pm last night. At first thought she was having a heart attack because pain was located in the upper abdomen and chest. Pain continued and migrated to her lower abdomen as well. Never had this type of pain before. Associated nausea, no emesis. Has loose bowel movement yesterday- no blood or melena. No fevers or chills, sob, constipation. Last PO intake yesterday at 2pm. She also reports that she has been taking approx 800-1000mg Ibuprofen daily since her knee surgery about 2 months ago. She was previously taking eliquis after knee surgery but stopped it 1 month ago.   AT OSH, WBC 15. CT scan with evidence of pneumoperitoneum at OSH with concern for GJ perforation.     Abdominal surgeries: lap RNYGB, c-sections   Anticoagulation: none       Post-Op Info:  Procedure(s) (LRB):  LAPAROSCOPY, DIAGNOSTIC - egd, laparascopic repair of ulcer with patch (N/A)   4 Days Post-Op     Interval History: NAEON, ambulating without issues.  Tolerating diet without nausea or vomiting.  Pain well controlled.  HDS.  Afebrile.  Drain SS.    Medications:  Continuous Infusions:   lactated ringers 150 mL/hr at 04/03/22 0603     Scheduled Meds:   amitriptyline  75 mg Oral QHS    fluconazole (DIFLUCAN) IV (PEDS and ADULTS)  200 mg Intravenous Q24H    heparin (porcine)  5,000 Units Subcutaneous Q8H    piperacillin-tazobactam (ZOSYN) IVPB  4.5 g Intravenous Q8H     PRN Meds:hydrALAZINE, HYDROmorphone, ondansetron, sodium chloride 0.9%     Review of patient's allergies indicates:   Allergen Reactions    Latuda [lurasidone] Other (See Comments)     "Makes my brain bad"    Lyrica [pregabalin] Other (See Comments)     "Makes my brain bad"     Objective: "     Vital Signs (Most Recent):  Temp: 97.9 °F (36.6 °C) (04/03/22 0402)  Pulse: 95 (04/03/22 0455)  Resp: 16 (04/03/22 0618)  BP: (!) 177/79 (04/03/22 0455)  SpO2: (!) 93 % (04/03/22 0402)   Vital Signs (24h Range):  Temp:  [97.3 °F (36.3 °C)-98.3 °F (36.8 °C)] 97.9 °F (36.6 °C)  Pulse:  [] 95  Resp:  [16-20] 16  SpO2:  [91 %-98 %] 93 %  BP: (147-186)/(66-83) 177/79     Weight: 119.9 kg (264 lb 5.3 oz)  Body mass index is 45.37 kg/m².    Intake/Output - Last 3 Shifts         04/01 0700  04/02 0659 04/02 0700 04/03 0659 04/03 0700  04/04 0659    P.O. 0 1020     I.V. (mL/kg) 4751.5 (39.6) 4414.3 (36.8)     IV Piggyback 488.6 570     Total Intake(mL/kg) 5240.1 (43.7) 6004.3 (50.1)     Urine (mL/kg/hr) 450 (0.2) 700 (0.2)     Drains 25 15     Stool 0 0     Total Output 475 715     Net +4765.1 +5289.3            Urine Occurrence 1 x 3 x     Stool Occurrence 4 x 0 x             Physical Exam  Vitals and nursing note reviewed.   Constitutional:       General: She is not in acute distress.     Appearance: Normal appearance. She is not ill-appearing, toxic-appearing or diaphoretic.   Cardiovascular:      Rate and Rhythm: Normal rate and regular rhythm.      Pulses: Normal pulses.   Pulmonary:      Effort: Pulmonary effort is normal. No respiratory distress.   Abdominal:      General: There is no distension.      Palpations: Abdomen is soft.      Tenderness: There is abdominal tenderness. There is no guarding.      Hernia: No hernia is present.      Comments: Abd soft, appropriately tender, non-distended, incisions CDI, steristrips in place   Musculoskeletal:         General: Normal range of motion.   Skin:     General: Skin is warm.      Capillary Refill: Capillary refill takes less than 2 seconds.      Coloration: Skin is not jaundiced.   Neurological:      General: No focal deficit present.      Mental Status: She is alert and oriented to person, place, and time.       Significant Labs:  CBC:   Recent Labs   Lab  04/02/22  0513   WBC 5.01   RBC 3.35*   HGB 9.5*   HCT 29.9*      MCV 89   MCH 28.4   MCHC 31.8*       CMP:   Recent Labs   Lab 04/03/22  0615   GLU 67*   CALCIUM 8.9      K 4.2   CO2 17*      BUN 9   CREATININE 0.7         Significant Diagnostics:  I have reviewed all pertinent imaging results/findings within the past 24 hours.    Assessment/Plan:     * Pneumoperitoneum  57 yo female with a history of RNYGB in 2011 with [place] and recent increased NSAID use due to knee surgery found to have a perforated marginal ulcer s/p lap repair and omental patch repair of her ulcer.  UGI on 4/1/22 with no evidence of persistent leak.    - CLD  - d/c mIVF   - IV abx and fluc  - PT/OT, encourage ambulation  -PRN pain and nausea control    Home later today vs. More likely tomorrow morning if doing well        Brendan Mendoza MD  General Surgery  [facility]

## 2022-04-03 NOTE — ASSESSMENT & PLAN NOTE
55 yo female with a history of RNYGB in 2011 with [place] and recent increased NSAID use due to knee surgery found to have a perforated marginal ulcer s/p lap repair and omental patch repair of her ulcer.  UGI on 4/1/22 with no evidence of persistent leak.    - CLD  - d/c mIVF   - IV abx and fluc  - PT/OT, encourage ambulation  -PRN pain and nausea control    Home later today vs. More likely tomorrow morning if doing well

## 2022-04-03 NOTE — PLAN OF CARE
POC reviewed with patient who verbalized understanding. AAOX4. Remains free of falls and injury. Patient up to ambulate in hallways x 2     - VSS on RA, except elevate BP, controlled with PRN medications  - 4 x lap sites w/steri strips  - LLQ KESHAV drain, minimal serosanguinous output  - mIVF d/c  - IV ABX     Tolerating clear liquid diet.  Nausea and pain controlled with PRN medications per MAR ATC. Educated on IS use. Patient denies chest pain & SOB. No acute events. No distress noted. Bed in lowest position, call light within reach, frequent rounds made for safety.      WC      Problem: Bariatric Environmental Safety  Goal: Safety Maintained with Care  Outcome: Ongoing, Progressing     Problem: Fall Injury Risk  Goal: Absence of Fall and Fall-Related Injury  Outcome: Ongoing, Progressing

## 2022-04-03 NOTE — SUBJECTIVE & OBJECTIVE
"Interval History: NAEON, ambulating without issues.  Tolerating diet without nausea or vomiting.  Pain well controlled.  HDS.  Afebrile.  Drain SS.    Medications:  Continuous Infusions:   lactated ringers 150 mL/hr at 04/03/22 0603     Scheduled Meds:   amitriptyline  75 mg Oral QHS    fluconazole (DIFLUCAN) IV (PEDS and ADULTS)  200 mg Intravenous Q24H    heparin (porcine)  5,000 Units Subcutaneous Q8H    piperacillin-tazobactam (ZOSYN) IVPB  4.5 g Intravenous Q8H     PRN Meds:hydrALAZINE, HYDROmorphone, ondansetron, sodium chloride 0.9%     Review of patient's allergies indicates:   Allergen Reactions    Latuda [lurasidone] Other (See Comments)     "Makes my brain bad"    Lyrica [pregabalin] Other (See Comments)     "Makes my brain bad"     Objective:     Vital Signs (Most Recent):  Temp: 97.9 °F (36.6 °C) (04/03/22 0402)  Pulse: 95 (04/03/22 0455)  Resp: 16 (04/03/22 0618)  BP: (!) 177/79 (04/03/22 0455)  SpO2: (!) 93 % (04/03/22 0402)   Vital Signs (24h Range):  Temp:  [97.3 °F (36.3 °C)-98.3 °F (36.8 °C)] 97.9 °F (36.6 °C)  Pulse:  [] 95  Resp:  [16-20] 16  SpO2:  [91 %-98 %] 93 %  BP: (147-186)/(66-83) 177/79     Weight: 119.9 kg (264 lb 5.3 oz)  Body mass index is 45.37 kg/m².    Intake/Output - Last 3 Shifts         04/01 0700 04/02 0659 04/02 0700 04/03 0659 04/03 0700 04/04 0659    P.O. 0 1020     I.V. (mL/kg) 4751.5 (39.6) 4414.3 (36.8)     IV Piggyback 488.6 570     Total Intake(mL/kg) 5240.1 (43.7) 6004.3 (50.1)     Urine (mL/kg/hr) 450 (0.2) 700 (0.2)     Drains 25 15     Stool 0 0     Total Output 475 715     Net +4765.1 +5289.3            Urine Occurrence 1 x 3 x     Stool Occurrence 4 x 0 x             Physical Exam  Vitals and nursing note reviewed.   Constitutional:       General: She is not in acute distress.     Appearance: Normal appearance. She is not ill-appearing, toxic-appearing or diaphoretic.   Cardiovascular:      Rate and Rhythm: Normal rate and regular rhythm.      Pulses: " Normal pulses.   Pulmonary:      Effort: Pulmonary effort is normal. No respiratory distress.   Abdominal:      General: There is no distension.      Palpations: Abdomen is soft.      Tenderness: There is abdominal tenderness. There is no guarding.      Hernia: No hernia is present.      Comments: Abd soft, appropriately tender, non-distended, incisions CDI, steristrips in place   Musculoskeletal:         General: Normal range of motion.   Skin:     General: Skin is warm.      Capillary Refill: Capillary refill takes less than 2 seconds.      Coloration: Skin is not jaundiced.   Neurological:      General: No focal deficit present.      Mental Status: She is alert and oriented to person, place, and time.       Significant Labs:  CBC:   Recent Labs   Lab 04/02/22  0513   WBC 5.01   RBC 3.35*   HGB 9.5*   HCT 29.9*      MCV 89   MCH 28.4   MCHC 31.8*       CMP:   Recent Labs   Lab 04/03/22  0615   GLU 67*   CALCIUM 8.9      K 4.2   CO2 17*      BUN 9   CREATININE 0.7         Significant Diagnostics:  I have reviewed all pertinent imaging results/findings within the past 24 hours.

## 2022-04-03 NOTE — PLAN OF CARE
Pt AAOx4. BP elevated overnight, PRN IV hydralazine given x2 this shift. All other VSS on RA. CLD maintained. No nausea. IVF/abx infusing per MAR. Lap sites x4 with steri-strips. L abd KESHAV drain with scant SS output. Pain managed with PRN meds. Up to toilet with stand-by assist. Adequate UOP. 0 BM. Call light within reach, bed low and locked. Safety precautions maintained.

## 2022-04-04 VITALS
WEIGHT: 264.31 LBS | OXYGEN SATURATION: 96 % | SYSTOLIC BLOOD PRESSURE: 181 MMHG | RESPIRATION RATE: 18 BRPM | TEMPERATURE: 97 F | BODY MASS INDEX: 45.12 KG/M2 | DIASTOLIC BLOOD PRESSURE: 88 MMHG | HEIGHT: 64 IN | HEART RATE: 95 BPM

## 2022-04-04 LAB
ANION GAP SERPL CALC-SCNC: 11 MMOL/L (ref 8–16)
BASOPHILS # BLD AUTO: 0.04 K/UL (ref 0–0.2)
BASOPHILS NFR BLD: 0.8 % (ref 0–1.9)
BUN SERPL-MCNC: 5 MG/DL (ref 6–20)
CALCIUM SERPL-MCNC: 9.1 MG/DL (ref 8.7–10.5)
CHLORIDE SERPL-SCNC: 104 MMOL/L (ref 95–110)
CO2 SERPL-SCNC: 28 MMOL/L (ref 23–29)
CREAT SERPL-MCNC: 0.7 MG/DL (ref 0.5–1.4)
DIFFERENTIAL METHOD: ABNORMAL
EOSINOPHIL # BLD AUTO: 0.2 K/UL (ref 0–0.5)
EOSINOPHIL NFR BLD: 3.3 % (ref 0–8)
ERYTHROCYTE [DISTWIDTH] IN BLOOD BY AUTOMATED COUNT: 14.2 % (ref 11.5–14.5)
EST. GFR  (AFRICAN AMERICAN): >60 ML/MIN/1.73 M^2
EST. GFR  (NON AFRICAN AMERICAN): >60 ML/MIN/1.73 M^2
GLUCOSE SERPL-MCNC: 90 MG/DL (ref 70–110)
HCT VFR BLD AUTO: 32.1 % (ref 37–48.5)
HGB BLD-MCNC: 10 G/DL (ref 12–16)
IMM GRANULOCYTES # BLD AUTO: 0.03 K/UL (ref 0–0.04)
IMM GRANULOCYTES NFR BLD AUTO: 0.6 % (ref 0–0.5)
LYMPHOCYTES # BLD AUTO: 0.8 K/UL (ref 1–4.8)
LYMPHOCYTES NFR BLD: 17.1 % (ref 18–48)
MAGNESIUM SERPL-MCNC: 1.2 MG/DL (ref 1.6–2.6)
MCH RBC QN AUTO: 27.9 PG (ref 27–31)
MCHC RBC AUTO-ENTMCNC: 31.2 G/DL (ref 32–36)
MCV RBC AUTO: 89 FL (ref 82–98)
MONOCYTES # BLD AUTO: 0.5 K/UL (ref 0.3–1)
MONOCYTES NFR BLD: 9.7 % (ref 4–15)
NEUTROPHILS # BLD AUTO: 3.3 K/UL (ref 1.8–7.7)
NEUTROPHILS NFR BLD: 68.5 % (ref 38–73)
NRBC BLD-RTO: 0 /100 WBC
PHOSPHATE SERPL-MCNC: 3.7 MG/DL (ref 2.7–4.5)
PLATELET # BLD AUTO: 184 K/UL (ref 150–450)
PMV BLD AUTO: 10.3 FL (ref 9.2–12.9)
POTASSIUM SERPL-SCNC: 4.8 MMOL/L (ref 3.5–5.1)
RBC # BLD AUTO: 3.59 M/UL (ref 4–5.4)
SODIUM SERPL-SCNC: 143 MMOL/L (ref 136–145)
WBC # BLD AUTO: 4.86 K/UL (ref 3.9–12.7)

## 2022-04-04 PROCEDURE — 80048 BASIC METABOLIC PNL TOTAL CA: CPT | Performed by: STUDENT IN AN ORGANIZED HEALTH CARE EDUCATION/TRAINING PROGRAM

## 2022-04-04 PROCEDURE — 25000003 PHARM REV CODE 250

## 2022-04-04 PROCEDURE — 84100 ASSAY OF PHOSPHORUS: CPT | Performed by: STUDENT IN AN ORGANIZED HEALTH CARE EDUCATION/TRAINING PROGRAM

## 2022-04-04 PROCEDURE — 25000003 PHARM REV CODE 250: Performed by: STUDENT IN AN ORGANIZED HEALTH CARE EDUCATION/TRAINING PROGRAM

## 2022-04-04 PROCEDURE — 97535 SELF CARE MNGMENT TRAINING: CPT

## 2022-04-04 PROCEDURE — 36415 COLL VENOUS BLD VENIPUNCTURE: CPT | Performed by: STUDENT IN AN ORGANIZED HEALTH CARE EDUCATION/TRAINING PROGRAM

## 2022-04-04 PROCEDURE — 63600175 PHARM REV CODE 636 W HCPCS: Performed by: STUDENT IN AN ORGANIZED HEALTH CARE EDUCATION/TRAINING PROGRAM

## 2022-04-04 PROCEDURE — 85025 COMPLETE CBC W/AUTO DIFF WBC: CPT | Performed by: STUDENT IN AN ORGANIZED HEALTH CARE EDUCATION/TRAINING PROGRAM

## 2022-04-04 PROCEDURE — 63600175 PHARM REV CODE 636 W HCPCS

## 2022-04-04 PROCEDURE — 83735 ASSAY OF MAGNESIUM: CPT | Performed by: STUDENT IN AN ORGANIZED HEALTH CARE EDUCATION/TRAINING PROGRAM

## 2022-04-04 PROCEDURE — 97116 GAIT TRAINING THERAPY: CPT

## 2022-04-04 RX ORDER — DIPHENHYDRAMINE HCL 25 MG
25 CAPSULE ORAL ONCE
Status: COMPLETED | OUTPATIENT
Start: 2022-04-04 | End: 2022-04-04

## 2022-04-04 RX ORDER — VALSARTAN 160 MG/1
320 TABLET ORAL DAILY
Status: DISCONTINUED | OUTPATIENT
Start: 2022-04-04 | End: 2022-04-04 | Stop reason: HOSPADM

## 2022-04-04 RX ORDER — HYDRALAZINE HYDROCHLORIDE 25 MG/1
25 TABLET, FILM COATED ORAL EVERY 8 HOURS
Status: DISCONTINUED | OUTPATIENT
Start: 2022-04-04 | End: 2022-04-04 | Stop reason: HOSPADM

## 2022-04-04 RX ADMIN — OXYCODONE HYDROCHLORIDE 10 MG: 10 TABLET ORAL at 04:04

## 2022-04-04 RX ADMIN — HYDRALAZINE HYDROCHLORIDE 10 MG: 20 INJECTION, SOLUTION INTRAMUSCULAR; INTRAVENOUS at 04:04

## 2022-04-04 RX ADMIN — VALSARTAN 320 MG: 160 TABLET, FILM COATED ORAL at 12:04

## 2022-04-04 RX ADMIN — HYDRALAZINE HYDROCHLORIDE 25 MG: 25 TABLET, FILM COATED ORAL at 12:04

## 2022-04-04 RX ADMIN — PIPERACILLIN SODIUM AND TAZOBACTAM SODIUM 4.5 G: 4; .5 INJECTION, POWDER, FOR SOLUTION INTRAVENOUS at 01:04

## 2022-04-04 RX ADMIN — HEPARIN SODIUM 5000 UNITS: 5000 INJECTION INTRAVENOUS; SUBCUTANEOUS at 05:04

## 2022-04-04 RX ADMIN — OXYCODONE HYDROCHLORIDE 10 MG: 10 TABLET ORAL at 10:04

## 2022-04-04 RX ADMIN — DIPHENHYDRAMINE HYDROCHLORIDE 25 MG: 25 CAPSULE ORAL at 10:04

## 2022-04-04 NOTE — PLAN OF CARE
Pt AAOx4. BP elevated overnight, PRN IV hydralazine given x2 this shift. Last /65. All other VSS on RA. CLD maintained. No nausea. IV abx. Lap sites x4 with steri-strips. L abd KESHAV drain with scant SS output. Pain managed with PRN meds. Up to toilet independently. Adequate UOP. x1 BM. Call light within reach, bed low and locked. Safety precautions maintained.

## 2022-04-04 NOTE — PT/OT/SLP PROGRESS
Occupational Therapy   Treatment and Discharge    Name: Geraldine Willson  MRN: [id number]  Admitting Diagnosis:  Pneumoperitoneum  5 Days Post-Op    Recommendations:     Discharge Recommendations: home  Discharge Equipment Recommendations:  none  Barriers to discharge:  None    Assessment:     Geraldine Willson is a 56 y.o. female with a medical diagnosis of Pneumoperitoneum.  Pt found upright in bed and agreeable for therapy today. Session focused on bedroom and hallway mobility, in addition to stair training to simulate household layout. Pt was supervision throughout all mobility today tolerating ~8 minutes total using no AD with no rest breaks. Pt ascended/descended ~8 stairs using HR's with increased time needed for safety. at this time, pt near baseline no longer requiring therapy while in the hospital setting. Pt is recommended to discharge to home at this time.    Performance deficits affecting function are weakness, impaired self care skills, impaired endurance, impaired functional mobilty, gait instability, decreased lower extremity function, pain, impaired balance, decreased upper extremity function, impaired cardiopulmonary response to activity.       Objective:     Communicated with: RN prior to session.  Patient found HOB elevated with oxygen, KESHAV drain upon OT entry to room.    General Precautions: Standard, fall   Orthopedic Precautions:N/A   Braces: N/A  Respiratory Status: Room air     Occupational Performance:     Bed Mobility:    · Patient completed Rolling/Turning to Left with  independence  · Patient completed Scooting/Bridging with independence  · Patient completed Supine to Sit with independence     Functional Mobility/Transfers:  · Patient completed Sit <> Stand Transfer with supervision  with  no assistive device   · Functional Mobility: Pt stood and mobilized in room and into hallway ~2 minutes total. Pt then completed stair training requiring increased time for safety using HR's. Pt  supervision throughout using no AD.     Activities of Daily Living:  · Upper Body Dressing: contact guard assistance donning gown in standing       [facility] 6 Click ADL: 20    Treatment & Education:  Pt educated on role of occupational therapy, POC, and safety during ADLs and functional mobility. Pt and OT discussed importance of safe, continued mobility to optimize daily living skills. Pt verbalized understanding.   Pt completed the following during session: UB dressing, bedroom and hallway mobility, discussion of POC, safe setup at bedside.  White board updated during session. Pt given instruction to call for medical staff/nurse for assistance.       Patient left sitting EOB  with all lines intact, call button in reach and RN notifiedEducation:      GOALS:   Multidisciplinary Problems     Occupational Therapy Goals     Not on file          Multidisciplinary Problems (Resolved)        Problem: Occupational Therapy    Goal Priority Disciplines Outcome Interventions   Occupational Therapy Goal   (Resolved)     OT, PT/OT Met    Description: Goals to be met by: 4/7/2022 (1 week)     Patient will increase functional independence with ADLs by performing:    UE Dressing with [place].  LE Dressing with [place].  Grooming while standing at sink with [place].  Toileting from toilet with [place] for hygiene and clothing management.   Rolling to Bilateral with [place].   Supine to sit with [place].  Step transfer with [place]  Toilet transfer to toilet with [place].                     Time Tracking:     OT Date of Treatment: 04/04/22  OT Start Time: 1030  OT Stop Time: 1045  OT Total Time (min): 15 min    Billable Minutes:Self Care/Home Management 15 min    OT/CHAD: OT          4/4/2022

## 2022-04-04 NOTE — PLAN OF CARE
Problem: Occupational Therapy  Goal: Occupational Therapy Goal  Description: Goals to be met by: 4/7/2022 (1 week)     Patient will increase functional independence with ADLs by performing:    UE Dressing with [place].  LE Dressing with [place].  Grooming while standing at sink with [place].  Toileting from toilet with [place] for hygiene and clothing management.   Rolling to Bilateral with [place].   Supine to sit with [place].  Step transfer with [place]  Toilet transfer to toilet with [place].    Pt met goals related to POC today. No further needs.  Ana Petty OT  4/4/2022    Outcome: Met

## 2022-04-04 NOTE — PLAN OF CARE
Discharge plan reviewed w/ pt. AVSS on RA. ARTUROQ KESHAV drain removed this AM. PIV removed. All Rx delivered to patient. No needs at this time. Awaiting transport.

## 2022-04-04 NOTE — PLAN OF CARE
PLAN IS TO DISCHARGE PT HOME NO NEEDS ALL APPT ON AVS  [facility]  Discharge Final Note    Primary Care Provider: No primary care provider on file.    Expected Discharge Date: 4/4/2022    Final Discharge Note (most recent)     Final Note - 04/04/22 1314        Final Note    Assessment Type Final Discharge Note     Anticipated Discharge Disposition Home or Self Care     Hospital Resources/Appts/Education Provided Appointments scheduled and added to AVS        Post-Acute Status    Discharge Delays None known at this time                 Important Message from Medicare             Contact Info     Dirk Segundo Jr, MD   Specialty: General Surgery, Bariatrics    [address]  [facility] [address]   Phone: [phone]       Next Steps: Follow up in 2 week(s)    Instructions: For wound re-check, post op follow up.    MEGHAN DAWKINS NP    [phone]       Next Steps: Follow up on 4/5/2022    Instructions: FOLLOW UP APPT @1PM ON 04/05/22

## 2022-04-04 NOTE — PLAN OF CARE
Problem: Physical Therapy  Goal: Physical Therapy Goal  Description: Goals to be met by: 4/10/22    Patient will increase functional independence with mobility by performin. Supine to sit with Stand-by Assistance - Met  2. Sit to supine with Stand-by Assistance - Met  3. Sit to stand transfer with Stand-by Assistance - Met  4. Gait  x 100 feet with Stand-by Assistance using LRAD. - Met  5. Ascend/descend 5 stairs with left Handrails Contact Guard Assistance using No Assistive Device.  - Met    Outcome: Met

## 2022-04-04 NOTE — PROGRESS NOTES
[facility]                    Pharmacy       Discharge Medication Education    Patient ACCEPTED medication education. Pharmacy has provided education on the name, indication, and possible side effects of the medication(s) prescribed, using teach-back method.     The following medications have also been discussed, during this admission.        Medication List        START taking these medications      ondansetron 4 MG Tbdl  Commonly known as: ZOFRAN-ODT  dissolve 2 tablets (8 mg total) by mouth 2 (two) times daily.     oxyCODONE 5 MG immediate release tablet  Commonly known as: ROXICODONE  Take 1 tablet (5 mg total) by mouth every 6 (six) hours as needed for Pain.            CONTINUE taking these medications      amitriptyline 75 MG tablet  Commonly known as: ELAVIL     ARIPiprazole 10 MG Tab  Commonly known as: ABILIFY     buPROPion 150 MG TB24 tablet  Commonly known as: WELLBUTRIN XL     LORazepam 0.5 MG tablet  Commonly known as: ATIVAN     valsartan 320 MG tablet  Commonly known as: DIOVAN            STOP taking these medications      meloxicam 15 MG tablet  Commonly known as: MOBIC               Where to Get Your Medications        These medications were sent to [facility]  [address] [place] [id number]      Hours: Mon-Fri 7a-7p, Sat-Sun 10a-4p Phone: [phone]   ondansetron 4 MG Tbdl  oxyCODONE 5 MG immediate release tablet          Thank you  Shawn Hayden, PharmD

## 2022-04-04 NOTE — DISCHARGE SUMMARY
[facility]  General Surgery  Discharge Summary      Patient Name: Geraldine Willson  MRN: [id number]  Admission Date: 3/30/2022  Hospital Length of Stay: 5 days  Discharge Date and Time:  04/04/2022 7:07 AM  Attending Physician: Dirk Segundo Jr.,*   Discharging Provider: Linden August MD  Primary Care Provider: No primary care provider on file.     HPI: Geraldine Willson is a 55 yo female with a history of RNYGB in 2011 with Sanya and recent knee surgery who presents as a transfer for pneumoperitoneum. She reports acute onset of pain starting at 9pm last night. At first thought she was having a heart attack because pain was located in the upper abdomen and chest. Pain continued and migrated to her lower abdomen as well. Never had this type of pain before. Associated nausea, no emesis. Has loose bowel movement yesterday- no blood or melena. No fevers or chills, sob, constipation. Last PO intake yesterday at 2pm. She also reports that she has been taking approx 800-1000mg Ibuprofen daily since her knee surgery about 2 months ago. She was previously taking eliquis after knee surgery but stopped it 1 month ago.   AT OSH, WBC 15. CT scan with evidence of pneumoperitoneum at OSH with concern for GJ perforation.      Abdominal surgeries: lap RNYGB, c-sections   Anticoagulation: none     Procedure(s) (LRB):  LAPAROSCOPY, DIAGNOSTIC - egd, laparascopic repair of ulcer with patch (N/A)     Hospital Course: Patient went to the OR on 3/30/22 for a laparoscopic marginal ulcer repair and john patch secondary to perforated marginal ulcer.  The patient tolerated the procedure well and was transferred to the PACU in stable condition.  Their post op course was uncomplicated.  The patients pain was controlled with PO medications.  Ambulating without issue.  Voiding without issue with adequate urine output. Tolerating diet.  Incision site is clean, dry, and intact.  Discharged on 4/4/22.    Consults:   Consults (From  admission, onward)        Status Ordering Provider     Inpatient consult to Registered Dietitian/Nutritionist  Once        Provider:  (Not yet assigned)    Ordered CHELLE MCDONALD     Inpatient consult to Midline team  Once        Provider:  (Not yet assigned)    Acknowledged JAMEL HAWTHORNE JR     Inpatient consult to Midline team  Once        Provider:  (Not yet assigned)    Acknowledged JAMEL HAWTHORNE JR        Physical Exam:  Physical Exam  Vitals and nursing note reviewed.   Constitutional:       General: She is not in acute distress.     Appearance: Normal appearance. She is obese. She is not ill-appearing.   HENT:      Head: Normocephalic.   Eyes:      Extraocular Movements: Extraocular movements intact.   Cardiovascular:      Rate and Rhythm: Normal rate.   Pulmonary:      Effort: Pulmonary effort is normal. No respiratory distress.   Abdominal:      Comments: Abd soft, appropriately tender, non-distended, incisions CDI, steristrips in place    Skin:     General: Skin is warm and dry.   Neurological:      General: No focal deficit present.      Mental Status: She is alert and oriented to person, place, and time.   Psychiatric:         Mood and Affect: Mood normal.         Behavior: Behavior normal.         Thought Content: Thought content normal.         Judgment: Judgment normal.           Significant Diagnostic Studies: Labs:   BMP:   Recent Labs   Lab 04/03/22  0615 04/04/22  0253   GLU 67* 90    143   K 4.2 4.8    104   CO2 17* 28   BUN 9 5*   CREATININE 0.7 0.7   CALCIUM 8.9 9.1   MG 1.3* 1.2*   , CMP   Recent Labs   Lab 04/03/22  0615 04/04/22  0253    143   K 4.2 4.8    104   CO2 17* 28   GLU 67* 90   BUN 9 5*   CREATININE 0.7 0.7   CALCIUM 8.9 9.1   ANIONGAP 14 11   ESTGFRAFRICA >60.0 >60.0   EGFRNONAA >60.0 >60.0    and CBC   Recent Labs   Lab 04/03/22  0615 04/04/22  0253   WBC 4.78 4.86   HGB 10.3* 10.0*   HCT 32.0* 32.1*    184      Radiology:  Narrative & Impression  EXAMINATION:  FL UPPER GI     CLINICAL HISTORY:  Hx RNYGB, presented with perforated marginal ulcer at GJ, r/o leak;     TECHNIQUE:  Contrast material: Omnipaque 350     Fluoroscopy time: 1.8 minutes     COMPARISON:  Upper GI 04/08/2011     FINDINGS:  Limited upper GI performed to evaluate for leak.      radiographs demonstrate surgical drain with tip in the upper mid abdomen and cholecystectomy clips in the right upper quadrant.     Postoperative change of Daniella-en-Y gastrojejunostomy.  Contrast passed quickly through the esophagus into the gastric remnant and then into the jejunal Daniella limb.  No extraluminal contrast to suggest leak.     Impression:     No evidence of leak in this patient with history of Daniella-en-Y gastrojejunostomy.    Pending Diagnostic Studies:     None        Final Active Diagnoses:    Diagnosis Date Noted POA    PRINCIPAL PROBLEM:  Pneumoperitoneum [K66.8] 03/30/2022 Yes    Acute marginal ulcer [K28.3]  Yes    Perforated ulcer [K27.5]  Yes      Problems Resolved During this Admission:      Discharged Condition: good    Disposition:     Follow Up:   Follow-up Information     Dirk Segundo Jr, MD Follow up in 2 week(s).    Specialties: General Surgery, Bariatrics  Why: For wound re-check, post op follow up.  Contact information:  [address] [place] [address] [phone]                       Patient Instructions:      Diet clear liquid     Lifting restrictions   Order Comments: Don't lift more than 10lbs for at least 6 weeks.     No driving until:   Order Comments: No driving while using narcotics.     No dressing needed     Notify your health care provider if you experience any of the following:  temperature >100.4     Notify your health care provider if you experience any of the following:  persistent nausea and vomiting or diarrhea     Notify your health care provider if you experience any of the following:  severe  uncontrolled pain     Notify your health care provider if you experience any of the following:  redness, tenderness, or signs of infection (pain, swelling, redness, odor or green/yellow discharge around incision site)     Notify your health care provider if you experience any of the following:  difficulty breathing or increased cough     Notify your health care provider if you experience any of the following:  severe persistent headache     Activity as tolerated     Shower on day dressing removed (No bath)   Order Comments: May shower.  Don't soak in a bath, pool, lake, etc for at least 2 weeks.     Medications:  Reconciled Home Medications:      Medication List      START taking these medications    ondansetron 4 MG Tbdl  Commonly known as: ZOFRAN-ODT  dissolve 2 tablets (8 mg total) by mouth 2 (two) times daily.     oxyCODONE 5 MG immediate release tablet  Commonly known as: ROXICODONE  Take 1 tablet (5 mg total) by mouth every 6 (six) hours as needed for Pain.        CONTINUE taking these medications    amitriptyline 75 MG tablet  Commonly known as: ELAVIL  Take 75 mg by mouth every evening.     ARIPiprazole 10 MG Tab  Commonly known as: ABILIFY  Take 5 mg by mouth once daily.     buPROPion 150 MG TB24 tablet  Commonly known as: WELLBUTRIN XL  Take 150 mg by mouth once daily.     LORazepam 0.5 MG tablet  Commonly known as: ATIVAN  Take 0.5 mg by mouth every 6 (six) hours as needed for Anxiety.     valsartan 320 MG tablet  Commonly known as: DIOVAN  Take 320 mg by mouth once daily.        STOP taking these medications    meloxicam 15 MG tablet  Commonly known as: LAVINIA August MD  General Surgery  [facility]

## 2022-04-04 NOTE — PT/OT/SLP PROGRESS
Physical Therapy Treatment/Discharge    Patient Name:  Geraldine Willson   MRN:  [id number]    Recommendations:     Discharge Recommendations:  home   Discharge Equipment Recommendations: none   Barriers to discharge: None    Assessment:     Geraldine Willson is a 56 y.o. female admitted with a medical diagnosis of Pneumoperitoneum.  She presents with the following impairments/functional limitations:  impaired endurance Pt. cooperative and tolerated treatment well. Pt. progressing with mobility and has met goals for acute PT.    Rehab Prognosis: Good; patient would benefit from acute skilled PT services to address these deficits and reach maximum level of function.    Recent Surgery: Procedure(s) (LRB):  LAPAROSCOPY, DIAGNOSTIC - egd, laparascopic repair of ulcer with patch (N/A) 5 Days Post-Op    Plan:       (Discontinue acute PT)     · Plan of Care Expires:  04/30/22    Subjective     Chief Complaint: decreased endurance  Patient/Family Comments/goals: pt. Agreeable to PT  Pain/Comfort:  · Pain Rating 1: 0/10  · Pain Rating Post-Intervention 1: 0/10      Objective:     Communicated with nursing prior to session.  Patient found supine with  (no current line) upon PT entry to room.     General Precautions: Standard, fall   Orthopedic Precautions:N/A   Braces: N/A  Respiratory Status: Room air     Functional Mobility:  · Bed Mobility:     · Rolling Left:  modified independence  · Scooting: modified independence  · Supine to Sit: modified independence  · Sit to Supine: modified independence  · Transfers:     · Sit to Stand:  modified independence with no AD  · Gait: >300' with Supervision without AD or LOB. Pt. occasionally using hallway handrail for support/security.  · Balance: fair+  · Stairs:  Pt ascended/descended 10 stair(s) with No Assistive Device with left handrail with Stand-by Assistance.       AM-PAC 6 CLICK MOBILITY  Turning over in bed (including adjusting bedclothes, sheets and blankets)?: 4  Sitting down  on and standing up from a chair with arms (e.g., wheelchair, bedside commode, etc.): 4  Moving from lying on back to sitting on the side of the bed?: 4  Moving to and from a bed to a chair (including a wheelchair)?: 4  Need to walk in hospital room?: 4  Climbing 3-5 steps with a railing?: 4  Basic Mobility Total Score: 24       Therapeutic Activities and Exercises:   Discussed pt.'s progress, goals, and POC.    Patient left supine with all lines intact and call button in reach..    GOALS:   Multidisciplinary Problems     Physical Therapy Goals     Not on file          Multidisciplinary Problems (Resolved)        Problem: Physical Therapy    Goal Priority Disciplines Outcome Goal Variances Interventions   Physical Therapy Goal   (Resolved)     PT, PT/OT Met     Description: Goals to be met by: 4/10/22    Patient will increase functional independence with mobility by performin. Supine to sit with Stand-by Assistance - Met  2. Sit to supine with Stand-by Assistance - Met  3. Sit to stand transfer with Stand-by Assistance - Met  4. Gait  x 100 feet with Stand-by Assistance using LRAD. - Met  5. Ascend/descend 5 stairs with left Handrails Contact Guard Assistance using No Assistive Device.  - Met                     Time Tracking:     PT Received On: 22  PT Start Time: 1339     PT Stop Time: 1348  PT Total Time (min): 9 min     Billable Minutes: Gait Training 9    Treatment Type: Treatment  PT/PTA: PT     PTA Visit Number: 0     2022

## 2022-04-05 ENCOUNTER — TELEPHONE (OUTPATIENT)
Dept: BARIATRICS | Facility: CLINIC | Age: 57
End: 2022-04-05

## 2022-04-05 ENCOUNTER — PATIENT MESSAGE (OUTPATIENT)
Dept: BARIATRICS | Facility: CLINIC | Age: 57
End: 2022-04-05
Payer: MEDICAID

## 2022-04-05 NOTE — TELEPHONE ENCOUNTER
----- Message from Linden August MD sent at 4/4/2022  7:33 AM CDT -----  Regarding: Patient Education Follow-up  Ms Matt. Geraldine Willson is a 56 y.o. who underwent RYGB in 2011 who presented on 3/30 with pneumoperitoneum and subsequently underwent ex-lap which demonstrated a marginal ulcer that was repaired.     She is to be discharged today on a bariatric clear liquid diet. Would someone be able to call her to review instructions for her diet?    Thank you!    Linden August

## 2022-04-05 NOTE — TELEPHONE ENCOUNTER
----- Message from Demian Joyner sent at 4/5/2022  8:41 AM CDT -----  Patient called stating she was returning a missed call regarding her 2 week bariatric liquid diet discharge instructions. Requesting callback [phone]

## 2022-04-05 NOTE — TELEPHONE ENCOUNTER
Called again, per Dr. Segundo to go over 2 week bariatric liquid diet discharge instructions. Will try again later today.

## 2022-04-07 ENCOUNTER — TELEPHONE (OUTPATIENT)
Dept: BARIATRICS | Facility: CLINIC | Age: 57
End: 2022-04-07
Payer: MEDICAID

## 2022-04-07 NOTE — TELEPHONE ENCOUNTER
----- Message from Carrie Oreilly sent at 4/7/2022  1:56 PM CDT -----  .Type:  RX Refill Request    Who Called: PT     Refill or New Rx: REFILL     RX Name and Strength: oxyCODONE (ROXICODONE) 5 MG immediate release tablet     Preferred Pharmacy with phone number: [facility] [phone]    Pt Call Back Number: [phone]    Additional Information: PLEASE HAVE THE DIETITIAN CALL PT AS WELL      Thank You

## 2022-04-11 ENCOUNTER — OFFICE VISIT (OUTPATIENT)
Dept: SURGERY | Facility: CLINIC | Age: 57
End: 2022-04-11
Payer: MEDICAID

## 2022-04-11 ENCOUNTER — TELEPHONE (OUTPATIENT)
Dept: BARIATRICS | Facility: CLINIC | Age: 57
End: 2022-04-11
Payer: MEDICAID

## 2022-04-11 VITALS
BODY MASS INDEX: 45.18 KG/M2 | HEIGHT: 61 IN | HEART RATE: 88 BPM | SYSTOLIC BLOOD PRESSURE: 191 MMHG | WEIGHT: 239.31 LBS | DIASTOLIC BLOOD PRESSURE: 93 MMHG

## 2022-04-11 DIAGNOSIS — Z09 POSTOP CHECK: Primary | ICD-10-CM

## 2022-04-11 PROCEDURE — 1160F PR REVIEW ALL MEDS BY PRESCRIBER/CLIN PHARMACIST DOCUMENTED: ICD-10-PCS | Mod: CPTII,,, | Performed by: SURGERY

## 2022-04-11 PROCEDURE — 99213 OFFICE O/P EST LOW 20 MIN: CPT | Mod: PBBFAC | Performed by: SURGERY

## 2022-04-11 PROCEDURE — 99999 PR PBB SHADOW E&M-EST. PATIENT-LVL III: ICD-10-PCS | Mod: PBBFAC,,, | Performed by: SURGERY

## 2022-04-11 PROCEDURE — 99024 PR POST-OP FOLLOW-UP VISIT: ICD-10-PCS | Mod: ,,, | Performed by: SURGERY

## 2022-04-11 PROCEDURE — 1159F PR MEDICATION LIST DOCUMENTED IN MEDICAL RECORD: ICD-10-PCS | Mod: CPTII,,, | Performed by: SURGERY

## 2022-04-11 PROCEDURE — 1160F RVW MEDS BY RX/DR IN RCRD: CPT | Mod: CPTII,,, | Performed by: SURGERY

## 2022-04-11 PROCEDURE — 3008F PR BODY MASS INDEX (BMI) DOCUMENTED: ICD-10-PCS | Mod: CPTII,,, | Performed by: SURGERY

## 2022-04-11 PROCEDURE — 3077F PR MOST RECENT SYSTOLIC BLOOD PRESSURE >= 140 MM HG: ICD-10-PCS | Mod: CPTII,,, | Performed by: SURGERY

## 2022-04-11 PROCEDURE — 3080F PR MOST RECENT DIASTOLIC BLOOD PRESSURE >= 90 MM HG: ICD-10-PCS | Mod: CPTII,,, | Performed by: SURGERY

## 2022-04-11 PROCEDURE — 1159F MED LIST DOCD IN RCRD: CPT | Mod: CPTII,,, | Performed by: SURGERY

## 2022-04-11 PROCEDURE — 99999 PR PBB SHADOW E&M-EST. PATIENT-LVL III: CPT | Mod: PBBFAC,,, | Performed by: SURGERY

## 2022-04-11 PROCEDURE — 99024 POSTOP FOLLOW-UP VISIT: CPT | Mod: ,,, | Performed by: SURGERY

## 2022-04-11 PROCEDURE — 3080F DIAST BP >= 90 MM HG: CPT | Mod: CPTII,,, | Performed by: SURGERY

## 2022-04-11 PROCEDURE — 3008F BODY MASS INDEX DOCD: CPT | Mod: CPTII,,, | Performed by: SURGERY

## 2022-04-11 PROCEDURE — 3077F SYST BP >= 140 MM HG: CPT | Mod: CPTII,,, | Performed by: SURGERY

## 2022-04-11 NOTE — TELEPHONE ENCOUNTER
----- Message from Dirk Segundo Jr., MD sent at 4/11/2022  1:23 PM CDT -----  Thsi patient had a gastric bypass 10 years ago.  She recently had a perforated ulcer and I wanted to have her move forward with a post op diet for her.  She prob needs to do liquids of one more week then move as a normal gastric bypass.  Do you mind calling and discussing with her.  Thanks.

## 2022-04-11 NOTE — PROGRESS NOTES
[facility]  Post Op Visit    SUBJECTIVE:  Geraldine Willson is a 57 y.o. female who presents to clinic today for post op follow up after diagnostic laparoscopy on 3/30/22 revealed a marginal ulcer with perforation at the GJ repaired primarily. A drain was placed around the repair site and was removed prior to discharge. She endorses occasional nausea and a deep epigastric pain with larger meals. She denies pain, fevers, chills, vomiting, diarrhea, constipation, or hematochezia and is returning to her usual activity level. She is tolerating a soft diet with normal appetite and bowel function and denies redness around or drainage from incisions.    OBJECTIVE:  Vitals:    04/11/22 1306   BP: (!) 191/93   Pulse: 88     Body mass index is 45.22 kg/m².    General: female in NAD. Not toxic appearing.   HENT: EOM intact.   Pulmonary: No respiratory distress. Effort normal.  Cardiovascular: Regular rate.   Abdomen: soft, non-tender, non-distended  Skin: Incisions are healing well without signs of infection. No erythema, drainage, or increased warmth noted.     ASSESSMENT/PLAN:    Geraldine Willson is a 57 y.o. y/o female who presents to clinic today for f/u s/p diagnostic lap with john patch repair for marginal ulcer at the GJ anastamosis on 3/30/22. Clinically improving and meeting all post op milestones     Patient is advised to avoid heavy lifting or strenuous activity for another 4 weeks.  Dietary guidance booklet provided.  Follow-up PRN.   Call clinic with any questions or concerns  ED precautions given.   All questions answered; patient is comfortable with the above follow-up plan and verbalized understanding.    Case discussed with Dr. Segundo.    Linden August MD  General Surgery, PGY-1        I have personally taken the history and examined this patient and agree with the resident's note as stated above.         Dirk Segundo MD

## (undated) DEVICE — SUT 0 VICRYL / UR6 (J603)

## (undated) DEVICE — CLOSURE SKIN STERI STRIP 1/2X4

## (undated) DEVICE — SCISSOR 5MMX35CM DIRECT DRIVE

## (undated) DEVICE — DRAPE CORETEMP FLD WRM 56X62IN

## (undated) DEVICE — TUBING HF INSUFFLATION W/ FLTR

## (undated) DEVICE — TROCAR ENDOPATH XCEL 5X100MM

## (undated) DEVICE — TRAY MINOR GEN SURG

## (undated) DEVICE — IRRIGATOR ENDOSCOPY DISP.

## (undated) DEVICE — APPLICATOR CHLORAPREP ORN 26ML

## (undated) DEVICE — EVACUATOR WOUND BULB 100CC

## (undated) DEVICE — SUT ETHILON 2-0 BLK PS-2

## (undated) DEVICE — SEE MEDLINE ITEM 157117

## (undated) DEVICE — SUT MCRYL PLUS 4-0 PS2 27IN

## (undated) DEVICE — Device

## (undated) DEVICE — ENDOSTITCH INSTRUMENT

## (undated) DEVICE — ENDOSTITCH POLYSORB 2-0 ES-9

## (undated) DEVICE — TROCAR ENDOPATH XCEL 12MM 10CM

## (undated) DEVICE — SYS SMOKE EVACUATION LAP

## (undated) DEVICE — CANNULA ENDOPATH XCEL 5X100MM

## (undated) DEVICE — DRAPE ABDOMINAL TIBURON 14X11

## (undated) DEVICE — ELECTRODE REM PLYHSV RETURN 9

## (undated) DEVICE — GOWN SURGICAL X-LARGE